# Patient Record
Sex: FEMALE | Race: WHITE | URBAN - METROPOLITAN AREA
[De-identification: names, ages, dates, MRNs, and addresses within clinical notes are randomized per-mention and may not be internally consistent; named-entity substitution may affect disease eponyms.]

---

## 2018-05-07 PROBLEM — Z00.00 ENCOUNTER FOR PREVENTIVE HEALTH EXAMINATION: Status: ACTIVE | Noted: 2018-05-07

## 2019-07-31 ENCOUNTER — EMERGENCY (EMERGENCY)
Facility: HOSPITAL | Age: 79
LOS: 0 days | Discharge: HOME | End: 2019-07-31
Attending: EMERGENCY MEDICINE | Admitting: EMERGENCY MEDICINE
Payer: MEDICARE

## 2019-07-31 VITALS
HEART RATE: 55 BPM | SYSTOLIC BLOOD PRESSURE: 102 MMHG | RESPIRATION RATE: 17 BRPM | DIASTOLIC BLOOD PRESSURE: 67 MMHG | TEMPERATURE: 97 F | OXYGEN SATURATION: 100 %

## 2019-07-31 VITALS — HEIGHT: 63 IN | WEIGHT: 100.09 LBS

## 2019-07-31 DIAGNOSIS — S52.025A NONDISPLACED FRACTURE OF OLECRANON PROCESS WITHOUT INTRAARTICULAR EXTENSION OF LEFT ULNA, INITIAL ENCOUNTER FOR CLOSED FRACTURE: ICD-10-CM
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
## 2019-07-31 DIAGNOSIS — M25.562 PAIN IN LEFT KNEE: ICD-10-CM
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
## 2019-07-31 PROCEDURE — 29105 APPLICATION LONG ARM SPLINT: CPT | Mod: LT

## 2019-07-31 PROCEDURE — 73080 X-RAY EXAM OF ELBOW: CPT | Mod: 26,LT

## 2019-07-31 PROCEDURE — 99284 EMERGENCY DEPT VISIT MOD MDM: CPT

## 2019-07-31 NOTE — ED STATDOCS - NSFOLLOWUPINSTRUCTIONS_ED_ALL_ED_FT
Please follow up with Dr. Ernandez in office tomorrow     Splint Care    WHAT YOU NEED TO KNOW:    Splint care is important to help protect your splint until it comes off. Some splints are made of fiberglass or plaster that will need to dry and harden. Splint care will help the splint dry and harden correctly. Even after your splint hardens, it can be damaged.    DISCHARGE INSTRUCTIONS:    Return to the emergency department if:     You have increased pain.      Your fingers or toes are numb or tingling.      You feel burning or stinging around your injury.      Your nails, fingers, or toes turn pale, blue, or gray, and feel cold.      You have new or increased trouble moving your fingers or toes.      Your swelling gets worse.      The skin under your splint is bleeding or leaking pus.     Contact your healthcare provider if:     Your hard splint gets wet or is damaged.      You have a fever.      Your splint feels tighter.      You have itchy, dry skin under your splint that is getting worse.      The skin under your splint is red, or you have a new sore.      You notice a bad smell coming from your splint.       You have questions or concerns about your condition or care.    How to care for your splint:     Wait for your hard splint to harden completely. You may have to wait up to 3 days before you can walk on a plaster splint.      Check your splint and the skin around it each day. Check your splint for damage, such as cracks and breaks. Check your skin for redness, increased swelling, and sores. Loosen the elastic bandage around your splint if it feels too tight.      Keep your splint clean and dry. Keep dirt out of your splint. Before you bathe, wrap your hard splint with 2 layers of plastic. Then put a plastic bag over it. Keep the plastic bag tightly sealed. You can also ask your healthcare provider about waterproof shields. Do not put your hard splint in the water, even with a plastic bag over it. A wet splint can make your skin itchy, and may lead to infection.      Do not put powders or deodorants inside your splint. These can dry your skin and increase itching.       Do not try to scratch the skin inside your hard splint with sharp objects. Sharp objects can break off inside your splint or hurt your skin.       Do not pull the padding out of your splint. The padding inside your splint protects your skin. You may develop a sore on your skin if you take out the padding.    Follow up with your healthcare provider as directed within 1 to 2 weeks: Write down your questions so you remember to ask them during your visits.

## 2019-07-31 NOTE — ED ADULT NURSE NOTE - NSIMPLEMENTINTERV_GEN_ALL_ED
Implemented All Fall Risk Interventions:  [place] to call system. Call bell, personal items and telephone within reach. Instruct patient to call for assistance. Room bathroom lighting operational. Non-slip footwear when patient is off stretcher. Physically safe environment: no spills, clutter or unnecessary equipment. Stretcher in lowest position, wheels locked, appropriate side rails in place. Provide visual cue, wrist band, yellow gown, etc. Monitor gait and stability. Monitor for mental status changes and reorient to person, place, and time. Review medications for side effects contributing to fall risk. Reinforce activity limits and safety measures with patient and family.

## 2019-07-31 NOTE — ED ADULT NURSE NOTE - OBJECTIVE STATEMENT
Pt presents to ED c/o left elbow pain s/p trip and fall from the step in the house into the garage yesterday. Pt denies hitting head and LOC. + movement, +sensation, cap refill less than 3 sec

## 2019-07-31 NOTE — ED STATDOCS - PROGRESS NOTE DETAILS
L Elbow XR with olecranon fx, hand specialist on call Dr. Awais quinn, awaiting callback.  b/l knee XRs ordered, however pt refused stating she does not have knee pain.   Nia Sharpe PA-C Discussed case with Dr. Santiago, he advised placing pt in long arm cast and having her f/u in office tomorrow, pt placed in splint, splint care discussed,  plan to d/c home with outpt f/u tomorrow, pt agreeable to d/c and plan of care, all questions answered, return precautions given  Nia Sharpe PA-C

## 2019-07-31 NOTE — ED STATDOCS - ATTENDING CONTRIBUTION TO CARE
I, Pipe Mcgraw, performed the initial face to face bedside interview with this patient regarding history of present illness, review of symptoms and relevant past medical, social and family history.  I completed an independent physical examination.  I was the initial provider who evaluated this patient. I have signed out the follow up of any pending tests (i.e. labs, radiological studies) to the ACP.  I have communicated the patient’s plan of care and disposition with the ACP.  The history, relevant review of systems, past medical and surgical history, medical decision making, and physical examination was documented by the scribe in my presence and I attest to the accuracy of the documentation.

## 2019-07-31 NOTE — ED ADULT TRIAGE NOTE - CHIEF COMPLAINT QUOTE
patient ambulated in with a steady gait. states she fell yesterday down 2 stairs onto her left elbow and knee. denies hitting head. denies use of blood thinners. abrasions noted to left elbow and knee. mild swelling to left knee. hematoma noted to left elbow. unknown last tetanus.

## 2019-07-31 NOTE — ED STATDOCS - MUSCULOSKELETAL, MLM
range of motion is not limited and there is no muscle tenderness. No spinal tenderness. clavicles nontender. +R leg TTP to medial aspect

## 2019-07-31 NOTE — ED STATDOCS - CARE PROVIDER_API CALL
Simón Ernandez)  Orthopaedic Surgery; Surgery of the Hand  [address] [place] [address]  Phone: [phone]  Fax: [phone]  Follow Up Time:

## 2019-07-31 NOTE — ED STATDOCS - CLINICAL SUMMARY MEDICAL DECISION MAKING FREE TEXT BOX
XR L elbow and b/l knees XR L elbow and b/l knees  XR with olecranon fx, pt refused knee XRs, discussed case with ortho hand Dr. Ernandez, advised long arm splint and f/u in office tmrw

## 2019-07-31 NOTE — ED STATDOCS - SKIN, MLM
+1cm abrasion to dorsal surface of L elbow with edema. +TTP to dorsal aspect of L elbow. +2cm abrasions to b/l knees

## 2019-07-31 NOTE — ED STATDOCS - OBJECTIVE STATEMENT
80 y/o F with no relevant PMHx presenting to the ED s/p fall yesterday. Pt was walking down some steps into the garage when she suddenly fell forward onto the ground hitting her L arm and b/l knees. Pt now in ED c/o L elbow and L knee pain. Pt reports abrasions to both L elbow and L knee, mild swelling to L knee and a hematoma to her L elbow. Denies head strike, recent Tetanus, back pain, neck pain, or hip pain. Pt ambulates independently at baseline. Pt is not anticoagulated. NKDA.

## 2019-12-20 PROBLEM — Z78.9 OTHER SPECIFIED HEALTH STATUS: Chronic | Status: ACTIVE | Noted: 2019-07-31

## 2020-08-04 ENCOUNTER — INPATIENT (INPATIENT)
Facility: HOSPITAL | Age: 80
LOS: 5 days | Discharge: HOME HEALTH | DRG: 270 | End: 2020-08-10
Attending: HOSPITALIST | Admitting: HOSPITALIST
Payer: MEDICARE

## 2020-08-04 VITALS
WEIGHT: 100.09 LBS | TEMPERATURE: 98 F | HEART RATE: 47 BPM | OXYGEN SATURATION: 100 % | DIASTOLIC BLOOD PRESSURE: 45 MMHG | RESPIRATION RATE: 18 BRPM | SYSTOLIC BLOOD PRESSURE: 90 MMHG

## 2020-08-04 DIAGNOSIS — R00.1 BRADYCARDIA, UNSPECIFIED: ICD-10-CM

## 2020-08-04 LAB
ANION GAP SERPL CALC-SCNC: 6 MMOL/L — SIGNIFICANT CHANGE UP (ref 5–17)
APTT BLD: 28 SEC — SIGNIFICANT CHANGE UP (ref 27.5–35.5)
BASOPHILS # BLD AUTO: 0.07 K/UL — SIGNIFICANT CHANGE UP (ref 0–0.2)
BASOPHILS NFR BLD AUTO: 0.8 % — SIGNIFICANT CHANGE UP (ref 0–2)
BUN SERPL-MCNC: 18 MG/DL — SIGNIFICANT CHANGE UP (ref 7–23)
CALCIUM SERPL-MCNC: 9.6 MG/DL — SIGNIFICANT CHANGE UP (ref 8.5–10.1)
CHLORIDE SERPL-SCNC: 106 MMOL/L — SIGNIFICANT CHANGE UP (ref 96–108)
CO2 SERPL-SCNC: 31 MMOL/L — SIGNIFICANT CHANGE UP (ref 22–31)
CREAT SERPL-MCNC: 0.73 MG/DL — SIGNIFICANT CHANGE UP (ref 0.5–1.3)
EOSINOPHIL # BLD AUTO: 0.12 K/UL — SIGNIFICANT CHANGE UP (ref 0–0.5)
EOSINOPHIL NFR BLD AUTO: 1.4 % — SIGNIFICANT CHANGE UP (ref 0–6)
GLUCOSE SERPL-MCNC: 141 MG/DL — HIGH (ref 70–99)
HCT VFR BLD CALC: 42.9 % — SIGNIFICANT CHANGE UP (ref 34.5–45)
HGB BLD-MCNC: 13.5 G/DL — SIGNIFICANT CHANGE UP (ref 11.5–15.5)
IMM GRANULOCYTES NFR BLD AUTO: 0.2 % — SIGNIFICANT CHANGE UP (ref 0–1.5)
INR BLD: 1.11 RATIO — SIGNIFICANT CHANGE UP (ref 0.88–1.16)
LYMPHOCYTES # BLD AUTO: 0.62 K/UL — LOW (ref 1–3.3)
LYMPHOCYTES # BLD AUTO: 7.3 % — LOW (ref 13–44)
MAGNESIUM SERPL-MCNC: 2.7 MG/DL — HIGH (ref 1.6–2.6)
MCHC RBC-ENTMCNC: 28.2 PG — SIGNIFICANT CHANGE UP (ref 27–34)
MCHC RBC-ENTMCNC: 31.5 GM/DL — LOW (ref 32–36)
MCV RBC AUTO: 89.6 FL — SIGNIFICANT CHANGE UP (ref 80–100)
MONOCYTES # BLD AUTO: 0.56 K/UL — SIGNIFICANT CHANGE UP (ref 0–0.9)
MONOCYTES NFR BLD AUTO: 6.6 % — SIGNIFICANT CHANGE UP (ref 2–14)
NEUTROPHILS # BLD AUTO: 7.09 K/UL — SIGNIFICANT CHANGE UP (ref 1.8–7.4)
NEUTROPHILS NFR BLD AUTO: 83.7 % — HIGH (ref 43–77)
PLATELET # BLD AUTO: 434 K/UL — HIGH (ref 150–400)
POTASSIUM SERPL-MCNC: 4.1 MMOL/L — SIGNIFICANT CHANGE UP (ref 3.5–5.3)
POTASSIUM SERPL-SCNC: 4.1 MMOL/L — SIGNIFICANT CHANGE UP (ref 3.5–5.3)
PROTHROM AB SERPL-ACNC: 12.9 SEC — SIGNIFICANT CHANGE UP (ref 10.6–13.6)
RBC # BLD: 4.79 M/UL — SIGNIFICANT CHANGE UP (ref 3.8–5.2)
RBC # FLD: 14.5 % — SIGNIFICANT CHANGE UP (ref 10.3–14.5)
SODIUM SERPL-SCNC: 143 MMOL/L — SIGNIFICANT CHANGE UP (ref 135–145)
TROPONIN I SERPL-MCNC: 0.58 NG/ML — HIGH (ref 0.01–0.04)
WBC # BLD: 8.48 K/UL — SIGNIFICANT CHANGE UP (ref 3.8–10.5)
WBC # FLD AUTO: 8.48 K/UL — SIGNIFICANT CHANGE UP (ref 3.8–10.5)

## 2020-08-04 PROCEDURE — 92978 ENDOLUMINL IVUS OCT C 1ST: CPT | Mod: RC

## 2020-08-04 PROCEDURE — 80053 COMPREHEN METABOLIC PANEL: CPT

## 2020-08-04 PROCEDURE — 84443 ASSAY THYROID STIM HORMONE: CPT

## 2020-08-04 PROCEDURE — 97530 THERAPEUTIC ACTIVITIES: CPT | Mod: GP

## 2020-08-04 PROCEDURE — 93005 ELECTROCARDIOGRAM TRACING: CPT

## 2020-08-04 PROCEDURE — 85730 THROMBOPLASTIN TIME PARTIAL: CPT

## 2020-08-04 PROCEDURE — 97116 GAIT TRAINING THERAPY: CPT | Mod: GP

## 2020-08-04 PROCEDURE — C1769: CPT

## 2020-08-04 PROCEDURE — 93010 ELECTROCARDIOGRAM REPORT: CPT

## 2020-08-04 PROCEDURE — 93458 L HRT ARTERY/VENTRICLE ANGIO: CPT | Mod: 59

## 2020-08-04 PROCEDURE — 85610 PROTHROMBIN TIME: CPT

## 2020-08-04 PROCEDURE — 36415 COLL VENOUS BLD VENIPUNCTURE: CPT

## 2020-08-04 PROCEDURE — 93925 LOWER EXTREMITY STUDY: CPT

## 2020-08-04 PROCEDURE — C1887: CPT

## 2020-08-04 PROCEDURE — 86900 BLOOD TYPING SEROLOGIC ABO: CPT

## 2020-08-04 PROCEDURE — 83735 ASSAY OF MAGNESIUM: CPT

## 2020-08-04 PROCEDURE — 84484 ASSAY OF TROPONIN QUANT: CPT

## 2020-08-04 PROCEDURE — 85025 COMPLETE CBC W/AUTO DIFF WBC: CPT

## 2020-08-04 PROCEDURE — C1874: CPT

## 2020-08-04 PROCEDURE — 80048 BASIC METABOLIC PNL TOTAL CA: CPT

## 2020-08-04 PROCEDURE — 86901 BLOOD TYPING SEROLOGIC RH(D): CPT

## 2020-08-04 PROCEDURE — C9600: CPT

## 2020-08-04 PROCEDURE — 70450 CT HEAD/BRAIN W/O DYE: CPT | Mod: 26

## 2020-08-04 PROCEDURE — C1753: CPT

## 2020-08-04 PROCEDURE — 33967 INSERT I-AORT PERCUT DEVICE: CPT

## 2020-08-04 PROCEDURE — 85027 COMPLETE CBC AUTOMATED: CPT

## 2020-08-04 PROCEDURE — 86769 SARS-COV-2 COVID-19 ANTIBODY: CPT

## 2020-08-04 PROCEDURE — 93970 EXTREMITY STUDY: CPT

## 2020-08-04 PROCEDURE — C1894: CPT

## 2020-08-04 PROCEDURE — C1889: CPT

## 2020-08-04 PROCEDURE — 93306 TTE W/DOPPLER COMPLETE: CPT

## 2020-08-04 PROCEDURE — 84100 ASSAY OF PHOSPHORUS: CPT

## 2020-08-04 PROCEDURE — 97163 PT EVAL HIGH COMPLEX 45 MIN: CPT | Mod: GP

## 2020-08-04 PROCEDURE — 71045 X-RAY EXAM CHEST 1 VIEW: CPT | Mod: 26,CS

## 2020-08-04 PROCEDURE — 86850 RBC ANTIBODY SCREEN: CPT

## 2020-08-04 PROCEDURE — G0269: CPT | Mod: RC

## 2020-08-04 PROCEDURE — 99223 1ST HOSP IP/OBS HIGH 75: CPT | Mod: AI

## 2020-08-04 PROCEDURE — 82533 TOTAL CORTISOL: CPT

## 2020-08-04 RX ORDER — SODIUM CHLORIDE 9 MG/ML
2000 INJECTION INTRAMUSCULAR; INTRAVENOUS; SUBCUTANEOUS ONCE
Refills: 0 | Status: COMPLETED | OUTPATIENT
Start: 2020-08-04 | End: 2020-08-04

## 2020-08-04 RX ORDER — SODIUM CHLORIDE 9 MG/ML
1000 INJECTION INTRAMUSCULAR; INTRAVENOUS; SUBCUTANEOUS ONCE
Refills: 0 | Status: COMPLETED | OUTPATIENT
Start: 2020-08-04 | End: 2020-08-04

## 2020-08-04 RX ORDER — NOREPINEPHRINE BITARTRATE/D5W 8 MG/250ML
0.05 PLASTIC BAG, INJECTION (ML) INTRAVENOUS
Qty: 8 | Refills: 0 | Status: DISCONTINUED | OUTPATIENT
Start: 2020-08-04 | End: 2020-08-05

## 2020-08-04 RX ORDER — ONDANSETRON 8 MG/1
4 TABLET, FILM COATED ORAL ONCE
Refills: 0 | Status: COMPLETED | OUTPATIENT
Start: 2020-08-04 | End: 2020-08-04

## 2020-08-04 RX ADMIN — ONDANSETRON 4 MILLIGRAM(S): 8 TABLET, FILM COATED ORAL at 23:10

## 2020-08-04 RX ADMIN — SODIUM CHLORIDE 2000 MILLILITER(S): 9 INJECTION INTRAMUSCULAR; INTRAVENOUS; SUBCUTANEOUS at 22:05

## 2020-08-04 RX ADMIN — Medication 4.26 MICROGRAM(S)/KG/MIN: at 22:44

## 2020-08-04 RX ADMIN — SODIUM CHLORIDE 2000 MILLILITER(S): 9 INJECTION INTRAMUSCULAR; INTRAVENOUS; SUBCUTANEOUS at 21:16

## 2020-08-04 RX ADMIN — SODIUM CHLORIDE 1000 MILLILITER(S): 9 INJECTION INTRAMUSCULAR; INTRAVENOUS; SUBCUTANEOUS at 23:00

## 2020-08-04 RX ADMIN — SODIUM CHLORIDE 1000 MILLILITER(S): 9 INJECTION INTRAMUSCULAR; INTRAVENOUS; SUBCUTANEOUS at 20:40

## 2020-08-04 RX ADMIN — SODIUM CHLORIDE 1000 MILLILITER(S): 9 INJECTION INTRAMUSCULAR; INTRAVENOUS; SUBCUTANEOUS at 21:15

## 2020-08-04 RX ADMIN — SODIUM CHLORIDE 1000 MILLILITER(S): 9 INJECTION INTRAMUSCULAR; INTRAVENOUS; SUBCUTANEOUS at 22:11

## 2020-08-04 NOTE — ED ADULT NURSE NOTE - CHIEF COMPLAINT QUOTE
pt presents to ED due to syncopal episode witnessed by son pt has no complaints right now Guilherme (son) [phone]

## 2020-08-04 NOTE — ED ADULT NURSE NOTE - OBJECTIVE STATEMENT
pt presents to ED due to syncopal episode witnessed by son pt has no complaints right now Guilherme (son) [phone]

## 2020-08-04 NOTE — ED ADULT NURSE NOTE - NSIMPLEMENTINTERV_GEN_ALL_ED
Implemented All Fall with Harm Risk Interventions:  [place] to call system. Call bell, personal items and telephone within reach. Instruct patient to call for assistance. Room bathroom lighting operational. Non-slip footwear when patient is off stretcher. Physically safe environment: no spills, clutter or unnecessary equipment. Stretcher in lowest position, wheels locked, appropriate side rails in place. Provide visual cue, wrist band, yellow gown, etc. Monitor gait and stability. Monitor for mental status changes and reorient to person, place, and time. Review medications for side effects contributing to fall risk. Reinforce activity limits and safety measures with patient and family. Provide visual clues: red socks.

## 2020-08-04 NOTE — ED ADULT TRIAGE NOTE - CHIEF COMPLAINT QUOTE
pt presents to ED due to syncopal episode witnessed by son pt has no complaints right now Guilherme (son) [phone]

## 2020-08-04 NOTE — ED PROVIDER NOTE - PROGRESS NOTE DETAILS
Scribe IN for Dr. Celis: Pt found to be bradycardic and hypotensive, asymptomatic, is awake alert. Bradycardia lasting for 5 min and goes back to NSR at rate of 60-80. At some point during conversing with pt, will contact cardiology and EP for consult. Scribe IN for Dr. Celis: Spoke with Dr. Logan EP who states to admit to CCU. Will set things up for pacemaker tomorrow morning.

## 2020-08-04 NOTE — ED PROVIDER NOTE - NS_ ATTENDINGSCRIBEDETAILS _ED_A_ED_FT
I, Tremayne Celis MD,  performed the initial face to face bedside interview with this patient regarding history of present illness, review of symptoms and relevant past medical, social and family history.  I completed an independent physical examination.    The history, relevant review of systems, past medical and surgical history, medical decision making, and physical examination was documented by the scribe in my presence and I attest to the accuracy of the documentation.

## 2020-08-05 DIAGNOSIS — I21.4 NON-ST ELEVATION (NSTEMI) MYOCARDIAL INFARCTION: ICD-10-CM

## 2020-08-05 DIAGNOSIS — I95.9 HYPOTENSION, UNSPECIFIED: ICD-10-CM

## 2020-08-05 DIAGNOSIS — R00.1 BRADYCARDIA, UNSPECIFIED: ICD-10-CM

## 2020-08-05 LAB
ALBUMIN SERPL ELPH-MCNC: 2.3 G/DL — LOW (ref 3.3–5)
ALBUMIN SERPL ELPH-MCNC: 2.4 G/DL — LOW (ref 3.3–5)
ALP SERPL-CCNC: 71 U/L — SIGNIFICANT CHANGE UP (ref 40–120)
ALP SERPL-CCNC: 76 U/L — SIGNIFICANT CHANGE UP (ref 40–120)
ALT FLD-CCNC: 167 U/L — HIGH (ref 12–78)
ALT FLD-CCNC: 210 U/L — HIGH (ref 12–78)
ANION GAP SERPL CALC-SCNC: 5 MMOL/L — SIGNIFICANT CHANGE UP (ref 5–17)
ANION GAP SERPL CALC-SCNC: 7 MMOL/L — SIGNIFICANT CHANGE UP (ref 5–17)
APTT BLD: 64.2 SEC — HIGH (ref 27.5–35.5)
AST SERPL-CCNC: 170 U/L — HIGH (ref 15–37)
AST SERPL-CCNC: 220 U/L — HIGH (ref 15–37)
BASOPHILS # BLD AUTO: 0.03 K/UL — SIGNIFICANT CHANGE UP (ref 0–0.2)
BASOPHILS NFR BLD AUTO: 0.4 % — SIGNIFICANT CHANGE UP (ref 0–2)
BILIRUB SERPL-MCNC: 0.3 MG/DL — SIGNIFICANT CHANGE UP (ref 0.2–1.2)
BILIRUB SERPL-MCNC: 0.5 MG/DL — SIGNIFICANT CHANGE UP (ref 0.2–1.2)
BUN SERPL-MCNC: 11 MG/DL — SIGNIFICANT CHANGE UP (ref 7–23)
BUN SERPL-MCNC: 13 MG/DL — SIGNIFICANT CHANGE UP (ref 7–23)
CALCIUM SERPL-MCNC: 7.5 MG/DL — LOW (ref 8.5–10.1)
CALCIUM SERPL-MCNC: 7.5 MG/DL — LOW (ref 8.5–10.1)
CHLORIDE SERPL-SCNC: 114 MMOL/L — HIGH (ref 96–108)
CHLORIDE SERPL-SCNC: 116 MMOL/L — HIGH (ref 96–108)
CO2 SERPL-SCNC: 22 MMOL/L — SIGNIFICANT CHANGE UP (ref 22–31)
CO2 SERPL-SCNC: 23 MMOL/L — SIGNIFICANT CHANGE UP (ref 22–31)
CORTIS AM PEAK SERPL-MCNC: 6.9 UG/DL — SIGNIFICANT CHANGE UP (ref 6–18.4)
CREAT SERPL-MCNC: 0.53 MG/DL — SIGNIFICANT CHANGE UP (ref 0.5–1.3)
CREAT SERPL-MCNC: 0.65 MG/DL — SIGNIFICANT CHANGE UP (ref 0.5–1.3)
EOSINOPHIL # BLD AUTO: 0.02 K/UL — SIGNIFICANT CHANGE UP (ref 0–0.5)
EOSINOPHIL NFR BLD AUTO: 0.3 % — SIGNIFICANT CHANGE UP (ref 0–6)
GLUCOSE SERPL-MCNC: 113 MG/DL — HIGH (ref 70–99)
GLUCOSE SERPL-MCNC: 90 MG/DL — SIGNIFICANT CHANGE UP (ref 70–99)
HCT VFR BLD CALC: 32.2 % — LOW (ref 34.5–45)
HCT VFR BLD CALC: 34.5 % — SIGNIFICANT CHANGE UP (ref 34.5–45)
HGB BLD-MCNC: 10.2 G/DL — LOW (ref 11.5–15.5)
HGB BLD-MCNC: 10.8 G/DL — LOW (ref 11.5–15.5)
IMM GRANULOCYTES NFR BLD AUTO: 0.4 % — SIGNIFICANT CHANGE UP (ref 0–1.5)
INR BLD: 1.55 RATIO — HIGH (ref 0.88–1.16)
LYMPHOCYTES # BLD AUTO: 0.6 K/UL — LOW (ref 1–3.3)
LYMPHOCYTES # BLD AUTO: 7.8 % — LOW (ref 13–44)
MAGNESIUM SERPL-MCNC: 2 MG/DL — SIGNIFICANT CHANGE UP (ref 1.6–2.6)
MCHC RBC-ENTMCNC: 28.3 PG — SIGNIFICANT CHANGE UP (ref 27–34)
MCHC RBC-ENTMCNC: 28.4 PG — SIGNIFICANT CHANGE UP (ref 27–34)
MCHC RBC-ENTMCNC: 31.3 GM/DL — LOW (ref 32–36)
MCHC RBC-ENTMCNC: 31.7 GM/DL — LOW (ref 32–36)
MCV RBC AUTO: 89.2 FL — SIGNIFICANT CHANGE UP (ref 80–100)
MCV RBC AUTO: 90.8 FL — SIGNIFICANT CHANGE UP (ref 80–100)
MONOCYTES # BLD AUTO: 0.64 K/UL — SIGNIFICANT CHANGE UP (ref 0–0.9)
MONOCYTES NFR BLD AUTO: 8.3 % — SIGNIFICANT CHANGE UP (ref 2–14)
NEUTROPHILS # BLD AUTO: 6.39 K/UL — SIGNIFICANT CHANGE UP (ref 1.8–7.4)
NEUTROPHILS NFR BLD AUTO: 82.8 % — HIGH (ref 43–77)
PHOSPHATE SERPL-MCNC: 3.1 MG/DL — SIGNIFICANT CHANGE UP (ref 2.5–4.5)
PLATELET # BLD AUTO: 269 K/UL — SIGNIFICANT CHANGE UP (ref 150–400)
PLATELET # BLD AUTO: 296 K/UL — SIGNIFICANT CHANGE UP (ref 150–400)
POTASSIUM SERPL-MCNC: 3.7 MMOL/L — SIGNIFICANT CHANGE UP (ref 3.5–5.3)
POTASSIUM SERPL-MCNC: 4 MMOL/L — SIGNIFICANT CHANGE UP (ref 3.5–5.3)
POTASSIUM SERPL-SCNC: 3.7 MMOL/L — SIGNIFICANT CHANGE UP (ref 3.5–5.3)
POTASSIUM SERPL-SCNC: 4 MMOL/L — SIGNIFICANT CHANGE UP (ref 3.5–5.3)
PROT SERPL-MCNC: 4.8 GM/DL — LOW (ref 6–8.3)
PROT SERPL-MCNC: 5.1 GM/DL — LOW (ref 6–8.3)
PROTHROM AB SERPL-ACNC: 17.6 SEC — HIGH (ref 10.6–13.6)
RBC # BLD: 3.61 M/UL — LOW (ref 3.8–5.2)
RBC # BLD: 3.8 M/UL — SIGNIFICANT CHANGE UP (ref 3.8–5.2)
RBC # FLD: 14.6 % — HIGH (ref 10.3–14.5)
RBC # FLD: 14.8 % — HIGH (ref 10.3–14.5)
SARS-COV-2 IGG SERPL QL IA: NEGATIVE — SIGNIFICANT CHANGE UP
SARS-COV-2 IGM SERPL IA-ACNC: <0.1 INDEX — SIGNIFICANT CHANGE UP
SARS-COV-2 RNA SPEC QL NAA+PROBE: SIGNIFICANT CHANGE UP
SODIUM SERPL-SCNC: 143 MMOL/L — SIGNIFICANT CHANGE UP (ref 135–145)
SODIUM SERPL-SCNC: 144 MMOL/L — SIGNIFICANT CHANGE UP (ref 135–145)
TROPONIN I SERPL-MCNC: 18.4 NG/ML — HIGH (ref 0.01–0.04)
TROPONIN I SERPL-MCNC: 25.4 NG/ML — HIGH (ref 0.01–0.04)
TROPONIN I SERPL-MCNC: 3.7 NG/ML — HIGH (ref 0.01–0.04)
TSH SERPL-MCNC: 0.92 UU/ML — SIGNIFICANT CHANGE UP (ref 0.34–4.82)
WBC # BLD: 7.45 K/UL — SIGNIFICANT CHANGE UP (ref 3.8–10.5)
WBC # BLD: 7.71 K/UL — SIGNIFICANT CHANGE UP (ref 3.8–10.5)
WBC # FLD AUTO: 7.45 K/UL — SIGNIFICANT CHANGE UP (ref 3.8–10.5)
WBC # FLD AUTO: 7.71 K/UL — SIGNIFICANT CHANGE UP (ref 3.8–10.5)

## 2020-08-05 PROCEDURE — 99291 CRITICAL CARE FIRST HOUR: CPT

## 2020-08-05 PROCEDURE — 33967 INSERT I-AORT PERCUT DEVICE: CPT

## 2020-08-05 PROCEDURE — G0508: CPT | Mod: GC,95

## 2020-08-05 PROCEDURE — 92941 PRQ TRLML REVSC TOT OCCL AMI: CPT | Mod: RC

## 2020-08-05 PROCEDURE — 93306 TTE W/DOPPLER COMPLETE: CPT | Mod: 26

## 2020-08-05 PROCEDURE — 93458 L HRT ARTERY/VENTRICLE ANGIO: CPT | Mod: 26,XU

## 2020-08-05 PROCEDURE — 99152 MOD SED SAME PHYS/QHP 5/>YRS: CPT

## 2020-08-05 PROCEDURE — 92978 ENDOLUMINL IVUS OCT C 1ST: CPT | Mod: 26,RC

## 2020-08-05 PROCEDURE — 99233 SBSQ HOSP IP/OBS HIGH 50: CPT

## 2020-08-05 PROCEDURE — 93010 ELECTROCARDIOGRAM REPORT: CPT

## 2020-08-05 PROCEDURE — 99223 1ST HOSP IP/OBS HIGH 75: CPT | Mod: 25

## 2020-08-05 RX ORDER — SODIUM CHLORIDE 9 MG/ML
1000 INJECTION INTRAMUSCULAR; INTRAVENOUS; SUBCUTANEOUS
Refills: 0 | Status: DISCONTINUED | OUTPATIENT
Start: 2020-08-05 | End: 2020-08-06

## 2020-08-05 RX ORDER — ONDANSETRON 8 MG/1
4 TABLET, FILM COATED ORAL EVERY 6 HOURS
Refills: 0 | Status: DISCONTINUED | OUTPATIENT
Start: 2020-08-05 | End: 2020-08-10

## 2020-08-05 RX ORDER — HEPARIN SODIUM 5000 [USP'U]/ML
700 INJECTION INTRAVENOUS; SUBCUTANEOUS
Qty: 25000 | Refills: 0 | Status: DISCONTINUED | OUTPATIENT
Start: 2020-08-05 | End: 2020-08-06

## 2020-08-05 RX ORDER — CHLORHEXIDINE GLUCONATE 213 G/1000ML
1 SOLUTION TOPICAL
Refills: 0 | Status: DISCONTINUED | OUTPATIENT
Start: 2020-08-05 | End: 2020-08-10

## 2020-08-05 RX ORDER — HEPARIN SODIUM 5000 [USP'U]/ML
INJECTION INTRAVENOUS; SUBCUTANEOUS
Qty: 25000 | Refills: 0 | Status: DISCONTINUED | OUTPATIENT
Start: 2020-08-05 | End: 2020-08-05

## 2020-08-05 RX ORDER — HEPARIN SODIUM 5000 [USP'U]/ML
3500 INJECTION INTRAVENOUS; SUBCUTANEOUS EVERY 6 HOURS
Refills: 0 | Status: DISCONTINUED | OUTPATIENT
Start: 2020-08-05 | End: 2020-08-05

## 2020-08-05 RX ORDER — NOREPINEPHRINE BITARTRATE/D5W 8 MG/250ML
0.03 PLASTIC BAG, INJECTION (ML) INTRAVENOUS
Qty: 8 | Refills: 0 | Status: DISCONTINUED | OUTPATIENT
Start: 2020-08-05 | End: 2020-08-05

## 2020-08-05 RX ORDER — ACETAMINOPHEN 500 MG
650 TABLET ORAL EVERY 4 HOURS
Refills: 0 | Status: DISCONTINUED | OUTPATIENT
Start: 2020-08-05 | End: 2020-08-10

## 2020-08-05 RX ORDER — HEPARIN SODIUM 5000 [USP'U]/ML
1500 INJECTION INTRAVENOUS; SUBCUTANEOUS EVERY 6 HOURS
Refills: 0 | Status: DISCONTINUED | OUTPATIENT
Start: 2020-08-05 | End: 2020-08-05

## 2020-08-05 RX ORDER — HEPARIN SODIUM 5000 [USP'U]/ML
5000 INJECTION INTRAVENOUS; SUBCUTANEOUS EVERY 12 HOURS
Refills: 0 | Status: DISCONTINUED | OUTPATIENT
Start: 2020-08-05 | End: 2020-08-05

## 2020-08-05 RX ORDER — SODIUM CHLORIDE 9 MG/ML
1000 INJECTION, SOLUTION INTRAVENOUS ONCE
Refills: 0 | Status: COMPLETED | OUTPATIENT
Start: 2020-08-05 | End: 2020-08-05

## 2020-08-05 RX ORDER — SODIUM CHLORIDE 9 MG/ML
1000 INJECTION INTRAMUSCULAR; INTRAVENOUS; SUBCUTANEOUS
Refills: 0 | Status: DISCONTINUED | OUTPATIENT
Start: 2020-08-05 | End: 2020-08-05

## 2020-08-05 RX ORDER — ASPIRIN/CALCIUM CARB/MAGNESIUM 324 MG
81 TABLET ORAL DAILY
Refills: 0 | Status: DISCONTINUED | OUTPATIENT
Start: 2020-08-06 | End: 2020-08-10

## 2020-08-05 RX ORDER — ATORVASTATIN CALCIUM 80 MG/1
40 TABLET, FILM COATED ORAL AT BEDTIME
Refills: 0 | Status: DISCONTINUED | OUTPATIENT
Start: 2020-08-05 | End: 2020-08-10

## 2020-08-05 RX ORDER — CLOPIDOGREL BISULFATE 75 MG/1
75 TABLET, FILM COATED ORAL DAILY
Refills: 0 | Status: DISCONTINUED | OUTPATIENT
Start: 2020-08-06 | End: 2020-08-10

## 2020-08-05 RX ORDER — ATROPINE SULFATE 0.1 MG/ML
0.5 SYRINGE (ML) INJECTION ONCE
Refills: 0 | Status: DISCONTINUED | OUTPATIENT
Start: 2020-08-05 | End: 2020-08-10

## 2020-08-05 RX ADMIN — SODIUM CHLORIDE 1000 MILLILITER(S): 9 INJECTION, SOLUTION INTRAVENOUS at 03:30

## 2020-08-05 RX ADMIN — SODIUM CHLORIDE 100 MILLILITER(S): 9 INJECTION INTRAMUSCULAR; INTRAVENOUS; SUBCUTANEOUS at 15:00

## 2020-08-05 RX ADMIN — HEPARIN SODIUM 800 UNIT(S)/HR: 5000 INJECTION INTRAVENOUS; SUBCUTANEOUS at 05:27

## 2020-08-05 RX ADMIN — SODIUM CHLORIDE 100 MILLILITER(S): 9 INJECTION INTRAMUSCULAR; INTRAVENOUS; SUBCUTANEOUS at 03:08

## 2020-08-05 RX ADMIN — SODIUM CHLORIDE 150 MILLILITER(S): 9 INJECTION INTRAMUSCULAR; INTRAVENOUS; SUBCUTANEOUS at 21:28

## 2020-08-05 RX ADMIN — Medication 2.55 MICROGRAM(S)/KG/MIN: at 05:27

## 2020-08-05 RX ADMIN — ATORVASTATIN CALCIUM 40 MILLIGRAM(S): 80 TABLET, FILM COATED ORAL at 21:28

## 2020-08-05 NOTE — PATIENT PROFILE ADULT - DOES PATIENT HAVE ADVANCE DIRECTIVE
pt's son is HCP; unable to provide information at this time; family is not at bedside and pt is forgetful

## 2020-08-05 NOTE — PROGRESS NOTE ADULT - SUBJECTIVE AND OBJECTIVE BOX
HPI:  80 year old female patient presented to the ED after a witnessed syncopal episode. Patient does not recall events surrounding loss of consciousness but states she was notified by her  that she appeared as though she was about to fall but was caught by her  and son before going limp. Patient with months long history of chest pain but denies any palpitations, dizziness, sob, peripheral edema, abdominal pain, nausea or vomiting.  In the ED patient with sinus bradycardia on telemetry ranging from mid 30s to 50s with occasional PVC. Patient was started on levophed by ED provider.  Pt reported to have mid anterior chest discomfort for a week and found elevating Troponin 0.58--> 3.7.   Pt brought to cath lab this early morning for ischemic evaluation.     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 IABP placed and LV FX showed EF 50%.     Interventional Conclusions     Successful PCI of RCA   IABP placed prior to intervention for cardiogenic shock     Recommendations     Manage with medical therapy.   IABP weaning off over the next 24 hrs.    < end of copied text >    ROS: denies chest pain/ pressure, SOB or palpitation     Vital Signs;  T(C): 36.6 (08-05-20 @ 04:29), Max: 36.7 (08-05-20 @ 03:00)  HR: 50 (08-05-20 @ 06:00) (39 - 70)  BP: 102/58 (08-05-20 @ 06:00) (51/43 - 105/49)  RR: 16 (08-05-20 @ 06:00) (12 - 18)  SpO2: 98% (08-05-20 @ 06:00) (94% - 100%)    Physical Exam   General: awake, no acute distress   HEENT: NCAT, neck supple   CV: RRR, normal S1S2, no murmur/ rub   Pulmonary: clear, no wheezing or rales   GI: +BS, soft, non-tender, non-distended   : voiding freely   Extremities: no edema, + 1 pedal pulses B/L LE   Skin: no rashes or lesion. Rt. femoral access site (s/p perclose), Lt femoral access with IABP; no hematoma or bleeding     Labs:  LABS: All Labs Reviewed:                        13.5   8.48  )-----------( 434      ( 04 Aug 2020 19:31 )             42.9     08-04    143  |  106  |  18  ----------------------------<  141<H>  4.1   |  31  |  0.73    Ca    9.6      04 Aug 2020 19:31  Mg     2.7     08-04      PT/INR - ( 04 Aug 2020 19:31 )   PT: 12.9 sec;   INR: 1.11 ratio     PTT - ( 04 Aug 2020 19:31 )  PTT:28.0 sec  CARDIAC MARKERS ( 05 Aug 2020 02:25 )  3.700 ng/mL / x     / x     / x     / x      CARDIAC MARKERS ( 04 Aug 2020 19:31 )  0.584 ng/mL / x     / x     / x     / x        Medications:  acetaminophen   Tablet .. 650 milliGRAM(s) Oral every 4 hours PRN  atorvastatin 40 milliGRAM(s) Oral at bedtime  atropine Injectable 0.5 milliGRAM(s) IV Push once PRN  chlorhexidine 4% Liquid 1 Application(s) Topical <User Schedule>  heparin  Infusion 700 Unit(s)/Hr IV Continuous <Continuous>  norepinephrine Infusion 0.03 MICROgram(s)/kG/Min IV Continuous <Continuous>  ondansetron Injectable 4 milliGRAM(s) IV Push every 6 hours PRN  sodium chloride 0.9%. 1000 milliLiter(s) IV Continuous <Continuous>  sodium chloride 0.9%. 1000 milliLiter(s) IV Continuous <Continuous>    # NSTEMI: s/p thrombectomy, s/p IABP, s/p mid RCA stent x1   - CCU monitor   - IV hydration  - Labs and EKG in am   - trend troponin   - continue ASA and Plavix 75 mg daily (given loading dose 600mg in cath lab)   - No BB/ ACEI due to hypotensive with bradycardia   - start Lipitor 40 mg daily   - maintain IABP, consider to dc tomorrow if pt stable   - Heparin 700 unit/ hr  while on IABP  - EP consult    - Echo for structure   - post procedure, outcome and follow up care reviewed with patient/Dr. Jesus   - follow up with Dr. Jesus in 4-7 days    Discussed the plan with Dr. Jesus, Pt and cath RN

## 2020-08-05 NOTE — CONSULT NOTE ADULT - PROBLEM SELECTOR RECOMMENDATION 2
monitor blood pressure and mantal satus   admit to CCU  keep atropine at the bedside as well as Zole monitor for symptomatic bradycardia  continue IVF hydration  follow electrolytes   echo cardiogram   EP aware of patient and to evaluate morning

## 2020-08-05 NOTE — PROGRESS NOTE ADULT - SUBJECTIVE AND OBJECTIVE BOX
Reason for Admission: Symptomatic bradycardia Syncope	  History of Present Illness: 	  80 year old female patient presented to the ED after a witnessed syncopal episode. Patient does not recall events surrounding loss of consciousness but states she was notified by her  that she appeared as though she was about to fall but was caught by her  and son before going limp. Patient with months long history of chest pain but denies any palpitations, dizziness, sob, peripheral edema, abdominal pain, nausea or vomiting.    In the ED patient with sinus bradycardia on telemetry ranging from mid 30s to 50s with occasional PVC. Patient was started on levophed by ED provider.      REVIEW OF SYSTEMS:  General: NAD, hemodynamically stable, (-)  fever, (-) chills, (-) weakness  HEENT:  Eyes:  No visual loss, blurred vision, double vision or yellow sclerae. Ears, Nose, Throat:  No hearing loss, sneezing, congestion, runny nose or sore throat.  SKIN:  No rash or itching.  CARDIOVASCULAR:  No chest pain, chest pressure or chest discomfort. No palpitations or edema.  RESPIRATORY:  No shortness of breath, cough or sputum.  GASTROINTESTINAL:  No anorexia, nausea, vomiting or diarrhea. No abdominal pain or blood.  NEUROLOGICAL:  No headache, dizziness, syncope, paralysis, ataxia, numbness or tingling in the extremities. No change in bowel or bladder control.  MUSCULOSKELETAL:  No muscle, back pain, joint pain or stiffness.  HEMATOLOGIC:  No anemia, bleeding or bruising.  LYMPHATICS:  No enlarged nodes. No history of splenectomy.  ENDOCRINOLOGIC:  No reports of sweating, cold or heat intolerance. No polyuria or polydipsia.  ALLERGIES:  No history of asthma, hives, eczema or rhinitis.    Physical Exam:   GENERAL APPEARANCE:  NAD, hemodynamically stable  T(C): 36.6 (08-05-20 @ 04:29), Max: 36.7 (08-05-20 @ 03:00)  HR: 43 (08-05-20 @ 10:00) (39 - 70)  BP: 91/51 (08-05-20 @ 10:00) (51/43 - 106/64)  RR: 16 (08-05-20 @ 10:00) (12 - 18)  SpO2: 94% (08-05-20 @ 10:00) (94% - 100%)  HEENT:  Head is normocephalic    Skin:  Warm and dry without any rash   NECK:  Supple without lymphadenopathy.   HEART:  Regular rate and rhythm. normal S1 and S2, No M/R/G  LUNGS:  Good ins/exp effort, no W/R/R/C  ABDOMEN:  Soft, nontender, nondistended with good bowel sounds heard  EXTREMITIES:  Without cyanosis, clubbing or edema.   NEUROLOGICAL:  Gross nonfocal     Labs:   CBC Full  -  ( 05 Aug 2020 10:19 )  WBC Count : 7.71 K/uL  RBC Count : 3.80 M/uL  Hemoglobin : 10.8 g/dL  Hematocrit : 34.5 %  Platelet Count - Automated : 296 K/uL    PT/INR - ( 05 Aug 2020 10:19 )   PT: 17.6 sec;   INR: 1.55 ratio         PTT - ( 04 Aug 2020 19:31 )  PTT:28.0 sec    08-05    143  |  114<H>  |  13  ----------------------------<  90  4.0   |  22  |  0.65    Ca    7.5<L>      05 Aug 2020 10:19  Phos  3.1     08-05  Mg     2.0     08-05    TPro  5.1<L>  /  Alb  2.4<L>  /  TBili  0.5  /  DBili  x   /  AST  220<H>  /  ALT  210<H>  /  AlkPhos  76  08-05      # NSTEMI: s/p thrombectomy, s/p IABP, s/p mid RCA stent x1   - DAPT / STATIN  - maintain IABP, consider to dc tomorrow if pt stable   - Heparin 700 unit/ hr  while on IABP  - follow up with Dr. Jesus in 4-7 days    # bradycardic and hypotensive secondary to above   - s/p cath off of pressors,  - to remain on IV heparin,   - IABP  - critical care will follow prn Reason for Admission: Symptomatic bradycardia Syncope	  History of Present Illness: 	  80 year old female patient presented to the ED after a witnessed syncopal episode. Patient does not recall events surrounding loss of consciousness but states she was notified by her  that she appeared as though she was about to fall but was caught by her  and son before going limp. Patient with months long history of chest pain but denies any palpitations, dizziness, sob, peripheral edema, abdominal pain, nausea or vomiting.    Patient continues to have low BPs in the 90s /  HRs in the 40s. asymptomatic. No arrhtyhmias on tele.       REVIEW OF SYSTEMS:  General: NAD, hemodynamically stable, (-)  fever, (-) chills, (-) weakness  HEENT:  Eyes:  No visual loss, blurred vision, double vision or yellow sclerae. Ears, Nose, Throat:  No hearing loss, sneezing, congestion, runny nose or sore throat.  SKIN:  No rash or itching.  CARDIOVASCULAR:  No chest pain, chest pressure or chest discomfort. No palpitations or edema.  RESPIRATORY:  No shortness of breath, cough or sputum.  GASTROINTESTINAL:  No anorexia, nausea, vomiting or diarrhea. No abdominal pain or blood.  NEUROLOGICAL:  No headache, dizziness, syncope, paralysis, ataxia, numbness or tingling in the extremities. No change in bowel or bladder control.  MUSCULOSKELETAL:  No muscle, back pain, joint pain or stiffness.  HEMATOLOGIC:  No anemia, bleeding or bruising.  LYMPHATICS:  No enlarged nodes. No history of splenectomy.  ENDOCRINOLOGIC:  No reports of sweating, cold or heat intolerance. No polyuria or polydipsia.  ALLERGIES:  No history of asthma, hives, eczema or rhinitis.    Physical Exam:   GENERAL APPEARANCE:  NAD, hemodynamically stable  T(C): 36.6 (08-05-20 @ 04:29), Max: 36.7 (08-05-20 @ 03:00)  HR: 43 (08-05-20 @ 10:00) (39 - 70)  BP: 91/51 (08-05-20 @ 10:00) (51/43 - 106/64)  RR: 16 (08-05-20 @ 10:00) (12 - 18)  SpO2: 94% (08-05-20 @ 10:00) (94% - 100%)  HEENT:  Head is normocephalic    Skin:  Warm and dry without any rash   NECK:  Supple without lymphadenopathy.   HEART:  Regular rate and rhythm. normal S1 and S2, No M/R/G  LUNGS:  Good ins/exp effort, no W/R/R/C  ABDOMEN:  Soft, nontender, nondistended with good bowel sounds heard  EXTREMITIES:  Without cyanosis, clubbing or edema.   NEUROLOGICAL:  Gross nonfocal     Labs:   CBC Full  -  ( 05 Aug 2020 10:19 )  WBC Count : 7.71 K/uL  RBC Count : 3.80 M/uL  Hemoglobin : 10.8 g/dL  Hematocrit : 34.5 %  Platelet Count - Automated : 296 K/uL    PT/INR - ( 05 Aug 2020 10:19 )   PT: 17.6 sec;   INR: 1.55 ratio         PTT - ( 04 Aug 2020 19:31 )  PTT:28.0 sec    08-05    143  |  114<H>  |  13  ----------------------------<  90  4.0   |  22  |  0.65    Ca    7.5<L>      05 Aug 2020 10:19  Phos  3.1     08-05  Mg     2.0     08-05    TPro  5.1<L>  /  Alb  2.4<L>  /  TBili  0.5  /  DBili  x   /  AST  220<H>  /  ALT  210<H>  /  AlkPhos  76  08-05      # NSTEMI: s/p thrombectomy, s/p IABP, s/p mid RCA stent x1   - DAPT / STATIN  - maintain IABP, consider to dc tomorrow if pt stable   - Heparin 700 unit/ hr  while on IABP  - follow up with Dr. Jesus in 4-7 days    # bradycardic / hypotensive secondary to ischemia  - s/p cath   - off pressors  - IABP

## 2020-08-05 NOTE — CONSULT NOTE ADULT - PROBLEM SELECTOR RECOMMENDATION 3
will hold on anticoagulation   Follow  troponin levels   12 lead EKG  echocardiogram  cardiology eval pending

## 2020-08-05 NOTE — H&P ADULT - ASSESSMENT
80 year old female patient with symptomatic bradycardia likely leading to syncopal episode        -Admit to CCU      #Symptomatic bradycardia  -pt currently asymptomatic  -was previously on levophed in the ED  -monitor in the CCU  -give atropine for symptomatic bradycardia  -IVF hydration  -get morning tsh, mag, phos  -get morning echo  -EP to evaluate pt    #Syncope  -DDX: Vasovagal, orthostatic, structural heart disease, arrhythmias, electrolyte abnormalities  -monitor on tele  -IVF hydration   -morning morning echo  -cardiology to evaluate pt    #Elevated troponin  -trend troponin  -serial EKG  -get morning echo  -cardiology to evaluate pt    #Hx of Dementia  -on donepezil but will hold in lieu of bradycardia  -supportive care     #Advanced directives  -full code    #DVT ppx  -heparin sq 80 year old female patient with symptomatic bradycardia likely leading to syncopal episode        -Admit to CCU      #Symptomatic bradycardia  -pt currently asymptomatic  -was previously on levophed in the ED  -monitor in the CCU  -give atropine for symptomatic bradycardia  -IVF hydration  -get morning tsh, mag, phos  -get morning echo  -NPO for possible cardiology intervention  -EP to evaluate pt    #Syncope  -DDX: Vasovagal, orthostatic, structural heart disease, arrhythmias, electrolyte abnormalities  -monitor on tele  -IVF hydration   -morning morning echo  -cardiology to evaluate pt    #Elevated troponin  -trend troponin  -serial EKG  -get morning echo  -cardiology to evaluate pt    #Hx of Dementia  -on donepezil but will hold in lieu of bradycardia  -supportive care     #Advanced directives  -full code    #DVT ppx  -heparin sq

## 2020-08-05 NOTE — CONSULT NOTE ADULT - SUBJECTIVE AND OBJECTIVE BOX
REASON FOR CONSULT: Hypotension    Chief Complaint    HPI:80 year old female patient presented to the ED after a witnessed syncopal episode. Patient does not recall events surrounding loss of consciousness but states she was notified by her  that she appeared as though she was about to fall but was caught by her  and son before going limp. Patient with months long history of chest pain but denies any palpitations, dizziness, sob, peripheral edema, abdominal pain, nausea or vomiting.    In the ED patient with sinus bradycardia on telemetry ranging from mid 30s to 50s with occasional PVC. Patient was started on levophed by ED provider.  Case was discussed with the ICU PA.  Levophed was turned off without acute issues.  Patient was mentating well and asymptomatic with noted BP in the 90s.  Baseline BP is not known by patient.      PAST MEDICAL & SURGICAL HISTORY:  Deformity of toe of right foot  No pertinent past medical history    Allergies    No Known Allergies      FAMILY HISTORY:      Medications:  acetaminophen   Tablet .. 650 milliGRAM(s) Oral every 4 hours PRN  ondansetron Injectable 4 milliGRAM(s) IV Push every 6 hours PRN  heparin   Injectable 5000 Unit(s) SubCutaneous every 12 hours  atropine Injectable 0.5 milliGRAM(s) IV Push once PRN  sodium chloride 0.9%. 1000 milliLiter(s) IV Continuous <Continuous>  chlorhexidine 4% Liquid 1 Application(s) Topical <User Schedule>        Vital Signs Last 24 Hrs  T(C): 36.6 (04 Aug 2020 19:07), Max: 36.6 (04 Aug 2020 19:07)  T(F): 97.8 (04 Aug 2020 19:07), Max: 97.8 (04 Aug 2020 19:07)  HR: 48 (05 Aug 2020 02:22) (39 - 70)  BP: 90/47 (05 Aug 2020 02:22) (78/49 - 105/49)  BP(mean): 62 (05 Aug 2020 02:22) (61 - 79)  RR: 17 (05 Aug 2020 02:22) (16 - 18)  SpO2: 98% (05 Aug 2020 02:22) (94% - 100%)      LABS:                        13.5   8.48  )-----------( 434      ( 04 Aug 2020 19:31 )             42.9     08-04    143  |  106  |  18  ----------------------------<  141<H>  4.1   |  31  |  0.73    Ca    9.6      04 Aug 2020 19:31  Mg     2.7     08-04        CARDIAC MARKERS ( 04 Aug 2020 19:31 )  0.584 ng/mL / x     / x     / x     / x          CAPILLARY BLOOD GLUCOSE        PT/INR - ( 04 Aug 2020 19:31 )   PT: 12.9 sec;   INR: 1.11 ratio         PTT - ( 04 Aug 2020 19:31 )  PTT:28.0 sec    CULTURES:      Physical Examination:  Physical exam as per bedside MD (direct physical examination could not be performed because the visit was provided via Telemedicine):       RADIOLOGY: < from: CT Head No Cont (08.04.20 @ 19:46) >  IMPRESSION:    No acute intracranial hemorrhage, mass effect, or evidence of acute vascular territorial infarction.    < end of copied text >    IMP-  Sinus Bradycardia  Hypotension  Near Syncope reported by family    REC/Plan-  For Telemetry/CCU  ICU will follow as consult until Cardiology evaluates in am.  Random Cortisol in am        CRITICAL CARE TIME SPENT: 30    This visit was provided via telemedicine. Patient was located at     [facility]    Provider was located at [facility] [place], [place] for the visit.

## 2020-08-05 NOTE — CONSULT NOTE ADULT - ASSESSMENT
80 year old female post syncopal episode with slightly elevated troponin and sinus bradycardia who was hypotensive but seems to be responding to IV fluid hydration and otherwise asymptomatic. Patient is to be admitted to CCU off IV pressors and monitored. Case discussed with EICU attending Dr Fonseca and hospitalist Dr Gómez. Patient is admitted to CCU under Dr Gómez with Critical care team following , EP cardiologists Dr Logan aware and will evaluate in morning.     Critical Care time: 35 mins assessing presenting problems of acute illness that poses high probability of life threatening deterioration or end organ damage/dysfunction.  Medical decision making including Initiating plan of care, reviewing data, reviewing radiology, direct patient bedside evaluation and interpretation of vital signs, any necessary ventilator management , discussion with multidisciplinary team, discussing goals of care with patient/family, all non inclusive of procedures 80 year old female post syncopal episode with slightly elevated troponin and sinus bradycardia who was hypotensive but seems to be responding to IV fluid hydration and otherwise asymptomatic. Patient is to be admitted to CCU off IV pressors and monitored. Case discussed with EICU attending Dr Fonseca and hospitalist Dr Gómez. Patient is admitted to CCU under Dr Gómez with Critical care team following , EP cardiologists Dr Logan aware and will evaluate in morning.     Critical Care time: 40 mins assessing presenting problems of acute illness that poses high probability of life threatening deterioration or end organ damage/dysfunction.  Medical decision making including Initiating plan of care, reviewing data, reviewing radiology, direct patient bedside evaluation and interpretation of vital signs, any necessary ventilator management , discussion with multidisciplinary team, discussing goals of care with patient/family, all non inclusive of procedures

## 2020-08-05 NOTE — CONSULT NOTE ADULT - PROBLEM SELECTOR RECOMMENDATION 9
likely related to intravascular depletion from poor PO intake   but threshold for bradycardia related and will monitor    pt remained hypotensive with heart rates ranging into the low 60's   responding slowly to IV fluids   continue IV fluid hydration  follow up random AM cortisol to rule out adrenal insuffiencey

## 2020-08-05 NOTE — ED ADULT NURSE REASSESSMENT NOTE - NS ED NURSE REASSESS COMMENT FT1
received call from CCU Cristina CARVALHO, Tobi Evans Pt must be admitted under intensivist service because Pt is on Levophed. Spoke to Intensivist Jesus (PA), per Jesus., Hospitalist must admit pt. Spoke to Dr. Gómez (Hospitalist) at 0115, Dr Gómez will admit pt. dr Gómez wants to monitor pts BP off Levophed. Levophed stopped at 0120. Will continue to monitor BP

## 2020-08-05 NOTE — H&P ADULT - NSHPPHYSICALEXAM_GEN_ALL_CORE
Vital Signs Last 24 Hrs  T(C): 36.6 (04 Aug 2020 19:07), Max: 36.6 (04 Aug 2020 19:07)  T(F): 97.8 (04 Aug 2020 19:07), Max: 97.8 (04 Aug 2020 19:07)  HR: 59 (05 Aug 2020 00:00) (39 - 70)  BP: 105/49 (05 Aug 2020 00:00) (78/49 - 105/49)  BP(mean): 74 (04 Aug 2020 23:44) (61 - 79)  RR: 16 (05 Aug 2020 00:00) (16 - 18)  SpO2: 100% (05 Aug 2020 00:00) (94% - 100%)

## 2020-08-05 NOTE — CONSULT NOTE ADULT - ASSESSMENT
80 yr old female admitted after syncopal episode witnessed by family.  She does not recall details of the event.  In ED she was bradycardic, became hypotensive requiring pressor support.  She ruled in for NSTEMI, troponins >0.56 > 3.7 > 18.7.  s/p cath  with PCI to RCA, thrombectomy and IABP placement for cardiogenic shock.    A/P: symptomatic bradycardia, NSTEMI s/p PCI to RCA  Continuous tele monitoring in CCU  Heart rate currently sinus ran 40-50 bpm  Avoid any AV mady blocking agents.  IABP to be weaned over 24 hrs  LVEF 50%  Will follow  Plan d/w Dr. Logan.

## 2020-08-05 NOTE — H&P ADULT - HISTORY OF PRESENT ILLNESS
80 year old female patient presented to the ED after a witnessed syncopal episode. Patient does not recall events surrounding loss of consciousness but states she was notified by her  that she appeared as though she was about to fall but was caught by her  and son before going limp. Patient with months long history of chest pain but denies any palpitations, dizziness, sob, peripheral edema, abdominal pain, nausea or vomiting.      In the ED patient with sinus bradycardia on telemetry ranging from mid 30s to 50s with occasional PVC. Patient was started on levophed by ED provider.

## 2020-08-05 NOTE — CONSULT NOTE ADULT - SUBJECTIVE AND OBJECTIVE BOX
HPI:  80 year old female patient presented to the ED after a witnessed syncopal episode by son. Patient does not recall events surrounding loss of consciousness but states she was notified by her  that she appeared as though she was about to fall but was caught by her  and son before going limp. Patient with months long history of chest pain but denies any palpitations, dizziness, sob, peripheral edema, abdominal pain, nausea or vomiting.  In the ED patient with sinus bradycardia on telemetry ranging from mid 30s to 50s with occasional PVC. Patient was started on levophed by ED provider. (05 Aug 2020 01:08)    8/5/20:  Ep asked to see pt with symptomatic bradycardia.  +NSTEMI,  s/p cardiac cath with PCI to mid-RCA, thrombectomy, IABP placement.  TELE: sinus ran, rate 40-50 bpm, PVCs      PAST MEDICAL & SURGICAL HISTORY:  Deformity of toe of right foot  No pertinent past medical history    SOCIAL HISTORY: , lives with .  Denies tobacco, etoh abuse or illicit drug use    FAMILY HISTORY: unknown        MEDICATIONS  (STANDING):  atorvastatin 40 milliGRAM(s) Oral at bedtime  chlorhexidine 4% Liquid 1 Application(s) Topical <User Schedule>  heparin  Infusion 700 Unit(s)/Hr (7 mL/Hr) IV Continuous <Continuous>  sodium chloride 0.9%. 1000 milliLiter(s) (100 mL/Hr) IV Continuous <Continuous>  sodium chloride 0.9%. 1000 milliLiter(s) (100 mL/Hr) IV Continuous <Continuous>    MEDICATIONS  (PRN):  acetaminophen   Tablet .. 650 milliGRAM(s) Oral every 4 hours PRN Mild Pain (1 - 3)  atropine Injectable 0.5 milliGRAM(s) IV Push once PRN for symptomatic bradycardia  ondansetron Injectable 4 milliGRAM(s) IV Push every 6 hours PRN Nausea      Allergies    No Known Allergies    Intolerances        Vital Signs Last 24 Hrs  T(C): 36.6 (05 Aug 2020 04:29), Max: 36.7 (05 Aug 2020 03:00)  T(F): 97.9 (05 Aug 2020 04:29), Max: 98 (05 Aug 2020 03:00)  HR: 53 (05 Aug 2020 13:00) (39 - 70)  BP: 89/55 (05 Aug 2020 13:00) (51/43 - 106/64)  BP(mean): 63 (05 Aug 2020 13:00) (45 - 79)  RR: 17 (05 Aug 2020 13:00) (12 - 18)  SpO2: 95% (05 Aug 2020 13:00) (94% - 100%)    REVIEW OF SYSTEMS:    CONSTITUTIONAL:  As per HPI.  HEENT:  Eyes:  No diplopia or blurred vision. ENT:  No earache, sore throat or runny nose.  CARDIOVASCULAR:  No pressure, squeezing, strangling, tightness, heaviness or aching about the chest, neck, axilla or epigastrium.  RESPIRATORY:  No cough, shortness of breath, PND or orthopnea.  GASTROINTESTINAL:  No nausea, vomiting or diarrhea.  GENITOURINARY:  No dysuria, frequency or urgency.  MUSCULOSKELETAL:  As per HPI.  SKIN:  No change in skin, hair or nails.  NEUROLOGIC:  No paresthesias, fasciculations, seizures or weakness.  PSYCHIATRIC:  No disorder of thought or mood.  ENDOCRINE:  No heat or cold intolerance, polyuria or polydipsia.  HEMATOLOGICAL:  No easy bruising or bleedings:  .     PHYSICAL EXAMINATION:    GENERAL APPEARANCE:  Pt. is not currently dyspneic. Pt. is alert, oriented, and pleasant, forgetful at times.  HEENT:  Pupils are normal and react normally. No icterus. Mucous membranes well colored.  NECK:  Supple. No lymphadenopathy. Jugular venous pressure not elevated. Carotids equal.   HEART:   The cardiac impulse has a normal quality. There are no murmurs, rubs or gallops noted  CHEST:  Chest is clear to auscultation. Normal respiratory effort.  ABDOMEN:  Soft and nontender.   EXTREMITIES:  There is no edema. Left groin IABP.  SKIN:  No rash or significant lesions are noted.    I&O's Summary    04 Aug 2020 07:01  -  05 Aug 2020 07:00  --------------------------------------------------------  IN: 0 mL / OUT: 200 mL / NET: -200 mL        LABS:                        10.8   7.71  )-----------( 296      ( 05 Aug 2020 10:19 )             34.5     08-05    143  |  114<H>  |  13  ----------------------------<  90  4.0   |  22  |  0.65    Ca    7.5<L>      05 Aug 2020 10:19  Phos  3.1     08-05  Mg     2.0     08-05    TPro  5.1<L>  /  Alb  2.4<L>  /  TBili  0.5  /  DBili  x   /  AST  220<H>  /  ALT  210<H>  /  AlkPhos  76  08-05    LIVER FUNCTIONS - ( 05 Aug 2020 10:19 )  Alb: 2.4 g/dL / Pro: 5.1 gm/dL / ALK PHOS: 76 U/L / ALT: 210 U/L / AST: 220 U/L / GGT: x           PT/INR - ( 05 Aug 2020 10:19 )   PT: 17.6 sec;   INR: 1.55 ratio         PTT - ( 04 Aug 2020 19:31 )  PTT:28.0 sec    CARDIAC MARKERS ( 05 Aug 2020 10:19 )  18.400 ng/mL / x     / x     / x     / x      CARDIAC MARKERS ( 05 Aug 2020 02:25 )  3.700 ng/mL / x     / x     / x     / x      CARDIAC MARKERS ( 04 Aug 2020 19:31 )  0.584 ng/mL / x     / x     / x     / x          < from: Cardiac Cath Lab - Adult (08.05.20 @ 07:21) >   Impression     Diagnostic Conclusions   1 vessel CAd with NSTEMI that progressed to RV infarct and cardiogenic   shock requiring Levophed support. IABP placed and LV FX showed EF 50%.     Interventional Conclusions     Successful PCI of RCA   IABP placed prior to intervention for cardiogenic shock     Recommendations     Manage with medical therapy.   IABP weaning off over the next 24 hrs.    Estimated Blood Loss:5ml.    Complications:  No complication.     Signatures     ----------------------------------------------------------------   Electronically signed by Yousif Jesus MD(Performing   Physician) on 08/05/2020 08:27   ----------------------------------------------------------------

## 2020-08-05 NOTE — PHYSICAL THERAPY INITIAL EVALUATION ADULT - DIAGNOSIS, PT EVAL
Syncopal episode, bradycardia/hypotension secondary to ischemia. NSTEMI, cardiac cath: s/p thrombectomy, s/p IABP, s/p mid RCA stent x 1.

## 2020-08-05 NOTE — PROGRESS NOTE ADULT - ASSESSMENT
Patient s/p NSTEMI with positive troponin  was bradycardic and hypotensive secondary to above   s/p cath off of pressors,  to remain on IV heparin,   IABP  critical care will follow prn

## 2020-08-05 NOTE — PHYSICAL THERAPY INITIAL EVALUATION ADULT - PERTINENT HX OF CURRENT PROBLEM, REHAB EVAL
Pt admitted to  secondary to syncopal episode and bradycardia. CT head: neg. Troponins: 8/4: 0.854, 8/5: 3.700, 8/5: 3.700. COVID 19: neg.

## 2020-08-05 NOTE — CONSULT NOTE ADULT - SUBJECTIVE AND OBJECTIVE BOX
80y  Female  No Known Allergies    CC: Patient is a 80y old  Female who presented with a chief complaint of Syncope     HPI:  80 year old female with no significant PMHx who presented to the ER after a witnessed syncopal episode by her son. Patient does not recall events surrounding loss of consciousness . I was called for consult at 0130 by ER MD for bradycardia on pressors, I found her fully alert and oriented x3 on extremely low dose of levophed which was started about 2230. Apparently she was Sinus bradycardia on arrival with heart rate ranging in the high 30's to high 50's and on arrival Im unsure of her initial blood pressure       She reported weeks of intermittent chest discomfort and states she has not been drinking PO fluids regularly and her son has been " bugging me about that"  She is fully alert and oriented from arrival to present. She has no complaints at this time and denies palpitations, dizziness, sob, abdominal pain, nausea or vomiting. The Levophed was stopped and systolic blood pressures are holding in the high 80's to low 90's,( MAP 58-66)  heart rate is ranging high 40's to low 60's ( sinus bradycardia)  I performed bedside POCUS which found normal IVC, no signs of collapse with respiratory variation ( note she has received 4 liters in ER) she has not made much urine ( will recommend bladder scan). Her ventricles via POCUS in parasternal long and apical 4 views do not appear to have depressed function and have good intraventricular volume.      PAST MEDICAL & SURGICAL HISTORY:  Deformity of toe of right foot  No pertinent past medical history    FAMILY HISTORY: none     Vital Signs Last 24 Hrs  T(C): 36.6 (04 Aug 2020 19:07), Max: 36.6 (04 Aug 2020 19:07)  T(F): 97.8 (04 Aug 2020 19:07), Max: 97.8 (04 Aug 2020 19:07)  HR: 48 (05 Aug 2020 02:22) (39 - 70)  BP: 90/47 (05 Aug 2020 02:22) (78/49 - 105/49)  BP(mean): 62 (05 Aug 2020 02:22) (61 - 79)  RR: 17 (05 Aug 2020 02:22) (16 - 18)  SpO2: 98% (05 Aug 2020 02:22) (94% - 100%)      LABS  08-04    143  |  106  |  18  ----------------------------<  141<H>  4.1   |  31  |  0.73    Ca    9.6      04 Aug 2020 19:31  Mg     2.7     08-04                        13.5   8.48  )-----------( 434      ( 04 Aug 2020 19:31 )             42.9     PT/INR - ( 04 Aug 2020 19:31 )   PT: 12.9 sec;   INR: 1.11 ratio    PTT - ( 04 Aug 2020 19:31 )  PTT:28.0 sec  CARDIAC MARKERS ( 04 Aug 2020 19:31 )  0.584 ng/mL / x     / x     / x     / x          MEDICATIONS  (STANDING):  chlorhexidine 4% Liquid 1 Application(s) Topical <User Schedule>  heparin   Injectable 5000 Unit(s) SubCutaneous every 12 hours  sodium chloride 0.9%. 1000 milliLiter(s) (100 mL/Hr) IV Continuous <Continuous>      REVIEW OF SYSTEMS:    CONSTITUTIONAL: No fever, weight loss, or fatigue  EYES: No eye pain, visual disturbances, or discharge  ENMT:  No difficulty hearing, tinnitus, vertigo; No sinus or throat pain  RESPIRATORY: No cough, wheezing, chills or hemoptysis; No shortness of breath  CARDIOVASCULAR: currently No chest pain, palpitations, dizziness, or leg swelling  GASTROINTESTINAL: No abdominal or epigastric pain. No nausea, vomiting, or hematemesis; No diarrhea or constipation. No melena or hematochezia.  GENITOURINARY: No dysuria, frequency, hematuria, or incontinence  NEUROLOGICAL: No headaches, memory loss, loss of strength, numbness, or tremors  SKIN: No itching, burning, rashes, or lesions   LYMPH NODES: No enlarged glands  ENDOCRINE: No heat or cold intolerance; No hair loss  MUSCULOSKELETAL: No joint pain or swelling; No muscle, back, or extremity pain  PSYCHIATRIC: No depression, anxiety, mood swings, or difficulty sleeping  HEME/LYMPH: No easy bruising, or bleeding gums  ALLERY AND IMMUNOLOGIC: No hives or eczema      Physicial Exam:     Constitutional: NAD, well-groomed, well-developed  HEENT: PERRLA, EOMI, no drainage or redness  Neck: No bruits; no thyromegaly or nodules,  No JVD  Respiratory: Breath Sounds equal & clear to percussion & auscultation, no accessory muscle use  Cardiovascular: Regular rate & rhythm, normal S1, S2; no murmurs, gallops or rubs; no S3, S4  Gastrointestinal: Soft, non-tender, non distended no hepatosplenomegaly, normal bowel sounds  Extremities: No peripheral edema, No cyanosis, clubbing   Vascular: Equal and normal pulses: 2+ peripheral pulses throughout  Neurological: GCS:    A&O x 3; no sensory, motor  deficits, normal reflexes  Psychiatric: Normal mood, normal affect  Musculoskeletal: No joint pain, swelling or deformity; no limitation of movement  Skin: No rashes

## 2020-08-05 NOTE — PHYSICAL THERAPY INITIAL EVALUATION ADULT - PLANNED THERAPY INTERVENTIONS, PT EVAL
bed mobility training/strengthening/Increase mobility as tolerated. Eval, transfers, bed mobility x 15'./transfer training/ROM

## 2020-08-05 NOTE — PROGRESS NOTE ADULT - SUBJECTIVE AND OBJECTIVE BOX
critical care follow up    HPI:       80 year old female patient presented to the ED after a witnessed syncopal episode. Patient does not recall events surrounding loss of consciousness but states she was notified by her  that she appeared as though she was about to fall but was caught by her  and son before going limp. Patient with a few months long history of chest pain  came to hospital after near syncopal event.  In ed was bradycardic and hypotension and was started on levophed.  Patient had positive troponin and was taken to cath lab  Patient had 100% RCA occlusion, and got stent, with insertion of IABP  Post procedure patient with no chest pain, not short of breathe, hr, 40- 50 with stable blood pressor    ROS no chest pain, no headache, no nausea, no abdominal pain , no leg pain,          no hx. diabetes,           ros otherwise negative     MEDICATIONS  (STANDING):  atorvastatin 40 milliGRAM(s) Oral at bedtime  chlorhexidine 4% Liquid 1 Application(s) Topical <User Schedule>  heparin  Infusion 700 Unit(s)/Hr (7 mL/Hr) IV Continuous <Continuous>  sodium chloride 0.9%. 1000 milliLiter(s) (100 mL/Hr) IV Continuous <Continuous>  sodium chloride 0.9%. 1000 milliLiter(s) (100 mL/Hr) IV Continuous <Continuous>    MEDICATIONS  (PRN):  acetaminophen   Tablet .. 650 milliGRAM(s) Oral every 4 hours PRN Mild Pain (1 - 3)  atropine Injectable 0.5 milliGRAM(s) IV Push once PRN for symptomatic bradycardia  ondansetron Injectable 4 milliGRAM(s) IV Push every 6 hours PRN Nausea    Weight (kg): 45.4 (08-04 @ 19:07)    ICU Vital Signs Last 24 Hrs  T(C): 36.6 (05 Aug 2020 04:29), Max: 36.7 (05 Aug 2020 03:00)  T(F): 97.9 (05 Aug 2020 04:29), Max: 98 (05 Aug 2020 03:00)  HR: 55 (05 Aug 2020 09:00) (39 - 70)  BP: 106/64 (05 Aug 2020 09:00) (51/43 - 106/64)  BP(mean): 73 (05 Aug 2020 09:00) (45 - 79)  ABP: --  ABP(mean): --  RR: 16 (05 Aug 2020 09:00) (12 - 18)  SpO2: 95% (05 Aug 2020 09:00) (94% - 100%)    I&O's Summary    04 Aug 2020 07:01  -  05 Aug 2020 07:00  --------------------------------------------------------  IN: 0 mL / OUT: 200 mL / NET: -200 mL                          13.5   8.48  )-----------( 434      ( 04 Aug 2020 19:31 )             42.9       08-04    143  |  106  |  18  ----------------------------<  141<H>  4.1   |  31  |  0.73    Ca    9.6      04 Aug 2020 19:31  Mg     2.7     08-04      CARDIAC MARKERS ( 05 Aug 2020 02:25 )  3.700 ng/mL / x     / x     / x     / x      CARDIAC MARKERS ( 04 Aug 2020 19:31 )  0.584 ng/mL / x     / x     / x     / x          DVT Prophylaxis:   IV Heparin                                                              Advanced Directives: Full COde

## 2020-08-05 NOTE — PHYSICAL THERAPY INITIAL EVALUATION ADULT - STANDING BALANCE: STATIC
----- Message from Christi Magana MD sent at 6/18/2020  8:33 AM CDT -----  a1c higher. Pt still prediabetic but VERY close to diabetes. Recommend diabetes education for prediabetes. Encourage limiting carbs and refined sugars. Recommend exercise 150 min per week. Recheck a1c 3 mo dx prediabetes    Chol is better but still high. Recommend increase pravastatin to 20mg daily. Call if worsening myalgias and then we can decrease dose again if intolerant  The 10-year ASCVD risk score (Marcio KWASI Jr., et al., 2013) is: 9.8%    Values used to calculate the score:      Age: 66 years      Sex: Female      Is Non- : No      Diabetic: No      Tobacco smoker: No      Systolic Blood Pressure: 150 mmHg      Is BP treated: No      HDL Cholesterol: 44 mg/dL      Total Cholesterol: 236 mg/dL    
Message left with male voice for patient to call back.  
Patient returns call and notified of message below. Patient agreeable to increase pravastatin, prescription sent to pharmacy. Patient declines diabetic education at this time. Lab order placed for A1c in 3 months  
Tried calling pt to inform her of message below but voicemail is not set up. Will try again at a later date.  
fair balance/RW

## 2020-08-05 NOTE — CHART NOTE - NSCHARTNOTEFT_GEN_A_CORE
Patient is a 80y old  Female who presents with a chief complaint of Symptomatic bradycardia, Syncope s/p LHC with DESTINY to RCA, IABP for support   Patient status post cath via RFA, closed via perclose. Site clean, dry, intact. Pulses present, capillary refill appropriate.  no signs of hematoma present, surrounding area soft. VSS no c/o pain. Patient resting comfortably in bed  IABP in LFA site is soft

## 2020-08-06 LAB
ANION GAP SERPL CALC-SCNC: 3 MMOL/L — LOW (ref 5–17)
BASOPHILS # BLD AUTO: 0.05 K/UL — SIGNIFICANT CHANGE UP (ref 0–0.2)
BASOPHILS NFR BLD AUTO: 0.7 % — SIGNIFICANT CHANGE UP (ref 0–2)
BUN SERPL-MCNC: 9 MG/DL — SIGNIFICANT CHANGE UP (ref 7–23)
CALCIUM SERPL-MCNC: 7.8 MG/DL — LOW (ref 8.5–10.1)
CHLORIDE SERPL-SCNC: 118 MMOL/L — HIGH (ref 96–108)
CO2 SERPL-SCNC: 25 MMOL/L — SIGNIFICANT CHANGE UP (ref 22–31)
CREAT SERPL-MCNC: 0.49 MG/DL — LOW (ref 0.5–1.3)
EOSINOPHIL # BLD AUTO: 0.1 K/UL — SIGNIFICANT CHANGE UP (ref 0–0.5)
EOSINOPHIL NFR BLD AUTO: 1.4 % — SIGNIFICANT CHANGE UP (ref 0–6)
GLUCOSE SERPL-MCNC: 85 MG/DL — SIGNIFICANT CHANGE UP (ref 70–99)
HCT VFR BLD CALC: 33.9 % — LOW (ref 34.5–45)
HGB BLD-MCNC: 10.8 G/DL — LOW (ref 11.5–15.5)
IMM GRANULOCYTES NFR BLD AUTO: 0.3 % — SIGNIFICANT CHANGE UP (ref 0–1.5)
LYMPHOCYTES # BLD AUTO: 0.67 K/UL — LOW (ref 1–3.3)
LYMPHOCYTES # BLD AUTO: 9.4 % — LOW (ref 13–44)
MCHC RBC-ENTMCNC: 28.8 PG — SIGNIFICANT CHANGE UP (ref 27–34)
MCHC RBC-ENTMCNC: 31.9 GM/DL — LOW (ref 32–36)
MCV RBC AUTO: 90.4 FL — SIGNIFICANT CHANGE UP (ref 80–100)
MONOCYTES # BLD AUTO: 0.62 K/UL — SIGNIFICANT CHANGE UP (ref 0–0.9)
MONOCYTES NFR BLD AUTO: 8.7 % — SIGNIFICANT CHANGE UP (ref 2–14)
NEUTROPHILS # BLD AUTO: 5.7 K/UL — SIGNIFICANT CHANGE UP (ref 1.8–7.4)
NEUTROPHILS NFR BLD AUTO: 79.5 % — HIGH (ref 43–77)
PLATELET # BLD AUTO: 258 K/UL — SIGNIFICANT CHANGE UP (ref 150–400)
POTASSIUM SERPL-MCNC: 4 MMOL/L — SIGNIFICANT CHANGE UP (ref 3.5–5.3)
POTASSIUM SERPL-SCNC: 4 MMOL/L — SIGNIFICANT CHANGE UP (ref 3.5–5.3)
RBC # BLD: 3.75 M/UL — LOW (ref 3.8–5.2)
RBC # FLD: 14.8 % — HIGH (ref 10.3–14.5)
SODIUM SERPL-SCNC: 146 MMOL/L — HIGH (ref 135–145)
WBC # BLD: 7.16 K/UL — SIGNIFICANT CHANGE UP (ref 3.8–10.5)
WBC # FLD AUTO: 7.16 K/UL — SIGNIFICANT CHANGE UP (ref 3.8–10.5)

## 2020-08-06 PROCEDURE — 93010 ELECTROCARDIOGRAM REPORT: CPT

## 2020-08-06 PROCEDURE — 99233 SBSQ HOSP IP/OBS HIGH 50: CPT

## 2020-08-06 PROCEDURE — 99232 SBSQ HOSP IP/OBS MODERATE 35: CPT

## 2020-08-06 RX ORDER — SODIUM CHLORIDE 9 MG/ML
1000 INJECTION, SOLUTION INTRAVENOUS
Refills: 0 | Status: DISCONTINUED | OUTPATIENT
Start: 2020-08-06 | End: 2020-08-07

## 2020-08-06 RX ADMIN — CLOPIDOGREL BISULFATE 75 MILLIGRAM(S): 75 TABLET, FILM COATED ORAL at 09:33

## 2020-08-06 RX ADMIN — Medication 81 MILLIGRAM(S): at 09:33

## 2020-08-06 RX ADMIN — CHLORHEXIDINE GLUCONATE 1 APPLICATION(S): 213 SOLUTION TOPICAL at 06:40

## 2020-08-06 RX ADMIN — SODIUM CHLORIDE 100 MILLILITER(S): 9 INJECTION, SOLUTION INTRAVENOUS at 10:54

## 2020-08-06 RX ADMIN — SODIUM CHLORIDE 100 MILLILITER(S): 9 INJECTION, SOLUTION INTRAVENOUS at 17:54

## 2020-08-06 RX ADMIN — ATORVASTATIN CALCIUM 40 MILLIGRAM(S): 80 TABLET, FILM COATED ORAL at 21:09

## 2020-08-06 NOTE — DIETITIAN INITIAL EVALUATION ADULT. - OTHER INFO
80 year old female patient presented to the ED after a witnessed syncopal episode. Patient does not recall events surrounding loss of consciousness but states she was notified by her  that she appeared as though she was about to fall but was caught by her  and son before going limp. Patient with months long history of chest pain but denies any palpitations, dizziness, sob, peripheral edema, abdominal pain, nausea or vomiting. NSTEMI s/p thrombectomy s/p IABP, s/p mid RCA stent x 1 pt to follow up with Dr. Jesus in 4-7 days. +Hypernatremia IVF switched to 1/2 NS. Possible d/c once cleared by cardio. Pt reports no specific diet followed PTA. Reports fluctuating po intake daily however has a good appetite. Stated # x 4 weeks ago indicating a 5# weight loss/ 5% (significant non intentional weight loss noted). Input: Normal saline IVF-total x 24hr 5425ml, Heprin drip 251ml. Output: voided 1950ml, last BM documented 8/5 x 1 day ago. No edema documented. Will continue to monitor and follow up prn. Pt meets criteria for severe protein/calorie malnutrition in context of acute illness. See below for recommendations.

## 2020-08-06 NOTE — CHART NOTE - NSCHARTNOTEFT_GEN_A_CORE
Nurse Practitioner Progress note:   s/p PCI  and IABP insertion for hemodynamic support. Placed on 2:1 support overnight. Denies CP, SOB.     A+Ox3  CV: S1s2 reg  Left groin site with IABP; no bleeding, no hematoma, site soft, non tender, positive pedal pulses bilaterally        T(C): 36.4 (08-06-20 @ 06:27), Max: 36.8 (08-05-20 @ 16:38)  HR: 66 (08-06-20 @ 09:00) (43 - 72)  BP: 136/50 (08-06-20 @ 09:00) (77/66 - 136/50)  RR: 19 (08-06-20 @ 09:00) (15 - 23)  SpO2: 93% (08-06-20 @ 09:00) (84% - 100%)    Tele: NSR with PVCs  EKG: NSR -no acute change from post PCI EKG    CBC Full  -  ( 06 Aug 2020 06:51 )  WBC Count : 7.16 K/uL  RBC Count : 3.75 M/uL  Hemoglobin : 10.8 g/dL  Hematocrit : 33.9 %  Platelet Count - Automated : 258 K/uL  Mean Cell Volume : 90.4 fl  Mean Cell Hemoglobin : 28.8 pg  Mean Cell Hemoglobin Concentration : 31.9 gm/dL  Auto Neutrophil # : 5.70 K/uL  Auto Lymphocyte # : 0.67 K/uL  Auto Monocyte # : 0.62 K/uL  Auto Eosinophil # : 0.10 K/uL  Auto Basophil # : 0.05 K/uL  Auto Neutrophil % : 79.5 %  Auto Lymphocyte % : 9.4 %  Auto Monocyte % : 8.7 %  Auto Eosinophil % : 1.4 %  Auto Basophil % : 0.7 %    08-06    146<H>  |  118<H>  |  9   ----------------------------<  85  4.0   |  25  |  0.49<L>    Ca    7.8<L>      06 Aug 2020 06:51  Phos  3.1     08-05  Mg     2.0     08-05    TPro  4.8<L>  /  Alb  2.3<L>  /  TBili  0.3  /  DBili  x   /  AST  170<H>  /  ALT  167<H>  /  AlkPhos  71  08-05    CARDIAC MARKERS ( 05 Aug 2020 17:58 )  25.400 ng/mL / x     / x     / x     / x      CARDIAC MARKERS ( 05 Aug 2020 10:19 )  18.400 ng/mL / x     / x     / x     / x      CARDIAC MARKERS ( 05 Aug 2020 02:25 )  3.700 ng/mL / x     / x     / x     / x      CARDIAC MARKERS ( 04 Aug 2020 19:31 )  0.584 ng/mL / x     / x     / x     / x              MEDICATIONS  (STANDING):  aspirin  chewable 81 milliGRAM(s) Chew daily  atorvastatin 40 milliGRAM(s) Oral at bedtime  chlorhexidine 4% Liquid 1 Application(s) Topical <User Schedule>  clopidogrel Tablet 75 milliGRAM(s) Oral daily  sodium chloride 0.9%. 1000 milliLiter(s) (100 mL/Hr) IV Continuous <Continuous>  sodium chloride 0.9%. 1000 milliLiter(s) (150 mL/Hr) IV Continuous <Continuous>    MEDICATIONS  (PRN):  acetaminophen   Tablet .. 650 milliGRAM(s) Oral every 4 hours PRN Mild Pain (1 - 3)  atropine Injectable 0.5 milliGRAM(s) IV Push once PRN for symptomatic bradycardia  ondansetron Injectable 4 milliGRAM(s) IV Push every 6 hours PRN Nausea        HPI:  80 year old female patient presented to the ED after a witnessed syncopal episode. Patient does not recall events surrounding loss of consciousness but states she was notified by her  that she appeared as though she was about to fall but was caught by her  and son before going limp. Patient with months long history of chest pain but denies any palpitations, dizziness, sob, peripheral edema, abdominal pain, nausea or vomiting.      In the ED patient with sinus bradycardia on telemetry ranging from mid 30s to 50s with occasional PVC. Patient was started on levophed by ED provider. (05 Aug 2020 01:08)  Levophed now discontinued     s/p IABP and PCI of RCA on 8/5    ASSESSMENT/PLAN:    Coronary artery disease/ NSTEMI    -Aspirin 81mg PO daily  -Plavix 75mg PO daily  -atorvastatin 40mg PO QHS   -Plan of care discussed with patient and  MD  -D/C  Heparih  -Remove IABP 2 hours after heparin discontinuation  -Discussed therapeutic lifestyle changes to reduce risk factors such as following a cardiac diet, weight loss, maintaining a healthy weight, exercise, smoking cessation, medication compliance, and regular follow-up  with MD Nurse Practitioner Progress note:   s/p PCI  and IABP insertion for hemodynamic support. Placed on 1:2 support overnight. Denies CP, SOB.     A+Ox3  CV: S1s2 reg  Left groin site with IABP; no bleeding, no hematoma, site soft, non tender, positive pedal pulses bilaterally        T(C): 36.4 (08-06-20 @ 06:27), Max: 36.8 (08-05-20 @ 16:38)  HR: 66 (08-06-20 @ 09:00) (43 - 72)  BP: 136/50 (08-06-20 @ 09:00) (77/66 - 136/50)  RR: 19 (08-06-20 @ 09:00) (15 - 23)  SpO2: 93% (08-06-20 @ 09:00) (84% - 100%)    Tele: NSR with PVCs  EKG: NSR -no acute change from post PCI EKG    CBC Full  -  ( 06 Aug 2020 06:51 )  WBC Count : 7.16 K/uL  RBC Count : 3.75 M/uL  Hemoglobin : 10.8 g/dL  Hematocrit : 33.9 %  Platelet Count - Automated : 258 K/uL  Mean Cell Volume : 90.4 fl  Mean Cell Hemoglobin : 28.8 pg  Mean Cell Hemoglobin Concentration : 31.9 gm/dL  Auto Neutrophil # : 5.70 K/uL  Auto Lymphocyte # : 0.67 K/uL  Auto Monocyte # : 0.62 K/uL  Auto Eosinophil # : 0.10 K/uL  Auto Basophil # : 0.05 K/uL  Auto Neutrophil % : 79.5 %  Auto Lymphocyte % : 9.4 %  Auto Monocyte % : 8.7 %  Auto Eosinophil % : 1.4 %  Auto Basophil % : 0.7 %    08-06    146<H>  |  118<H>  |  9   ----------------------------<  85  4.0   |  25  |  0.49<L>    Ca    7.8<L>      06 Aug 2020 06:51  Phos  3.1     08-05  Mg     2.0     08-05    TPro  4.8<L>  /  Alb  2.3<L>  /  TBili  0.3  /  DBili  x   /  AST  170<H>  /  ALT  167<H>  /  AlkPhos  71  08-05    CARDIAC MARKERS ( 05 Aug 2020 17:58 )  25.400 ng/mL / x     / x     / x     / x      CARDIAC MARKERS ( 05 Aug 2020 10:19 )  18.400 ng/mL / x     / x     / x     / x      CARDIAC MARKERS ( 05 Aug 2020 02:25 )  3.700 ng/mL / x     / x     / x     / x      CARDIAC MARKERS ( 04 Aug 2020 19:31 )  0.584 ng/mL / x     / x     / x     / x              MEDICATIONS  (STANDING):  aspirin  chewable 81 milliGRAM(s) Chew daily  atorvastatin 40 milliGRAM(s) Oral at bedtime  chlorhexidine 4% Liquid 1 Application(s) Topical <User Schedule>  clopidogrel Tablet 75 milliGRAM(s) Oral daily  sodium chloride 0.9%. 1000 milliLiter(s) (100 mL/Hr) IV Continuous <Continuous>  sodium chloride 0.9%. 1000 milliLiter(s) (150 mL/Hr) IV Continuous <Continuous>    MEDICATIONS  (PRN):  acetaminophen   Tablet .. 650 milliGRAM(s) Oral every 4 hours PRN Mild Pain (1 - 3)  atropine Injectable 0.5 milliGRAM(s) IV Push once PRN for symptomatic bradycardia  ondansetron Injectable 4 milliGRAM(s) IV Push every 6 hours PRN Nausea        HPI:  80 year old female patient presented to the ED after a witnessed syncopal episode. Patient does not recall events surrounding loss of consciousness but states she was notified by her  that she appeared as though she was about to fall but was caught by her  and son before going limp. Patient with months long history of chest pain but denies any palpitations, dizziness, sob, peripheral edema, abdominal pain, nausea or vomiting.      In the ED patient with sinus bradycardia on telemetry ranging from mid 30s to 50s with occasional PVC. Patient was started on levophed by ED provider. (05 Aug 2020 01:08)  Levophed now discontinued     s/p IABP and PCI of RCA on 8/5    ASSESSMENT/PLAN:    Coronary artery disease/ NSTEMI    -Aspirin 81mg PO daily  -Plavix 75mg PO daily  -atorvastatin 40mg PO QHS   -Plan of care discussed with patient and  MD  -D/C  Heparih  -Remove IABP 2 hours after heparin discontinuation  -Discussed therapeutic lifestyle changes to reduce risk factors such as following a cardiac diet, weight loss, maintaining a healthy weight, exercise, smoking cessation, medication compliance, and regular follow-up  with MD

## 2020-08-06 NOTE — DIETITIAN INITIAL EVALUATION ADULT. - PERTINENT MEDS FT
MEDICATIONS  (STANDING):  aspirin  chewable 81 milliGRAM(s) Chew daily  atorvastatin 40 milliGRAM(s) Oral at bedtime  chlorhexidine 4% Liquid 1 Application(s) Topical <User Schedule>  clopidogrel Tablet 75 milliGRAM(s) Oral daily  sodium chloride 0.45%. 1000 milliLiter(s) (100 mL/Hr) IV Continuous <Continuous>    MEDICATIONS  (PRN):  acetaminophen   Tablet .. 650 milliGRAM(s) Oral every 4 hours PRN Mild Pain (1 - 3)  atropine Injectable 0.5 milliGRAM(s) IV Push once PRN for symptomatic bradycardia  ondansetron Injectable 4 milliGRAM(s) IV Push every 6 hours PRN Nausea

## 2020-08-06 NOTE — PROGRESS NOTE ADULT - SUBJECTIVE AND OBJECTIVE BOX
CHIEF COMPLAINT:    HPI:  80 year old female patient presented to the ED after a witnessed syncopal episode.  patient was noted to have intermittent hypotension   episodes of bradycardia , noted to have borderline elevated ST , subsequent cardiac cath   RCA  stent , patient was placed on IABP   patient is still on support 2:1   patient denies any chest pain or shortness of breath or palpitation         PAST MEDICAL & SURGICAL HISTORY:  Deformity of toe of right foot  No pertinent past medical history      Allergies    No Known Allergies    Intolerances        MEDICATIONS:    MEDICATIONS  (STANDING):  aspirin  chewable 81 milliGRAM(s) Chew daily  atorvastatin 40 milliGRAM(s) Oral at bedtime  chlorhexidine 4% Liquid 1 Application(s) Topical <User Schedule>  clopidogrel Tablet 75 milliGRAM(s) Oral daily  heparin  Infusion 700 Unit(s)/Hr (7 mL/Hr) IV Continuous <Continuous>  sodium chloride 0.9%. 1000 milliLiter(s) (100 mL/Hr) IV Continuous <Continuous>  sodium chloride 0.9%. 1000 milliLiter(s) (150 mL/Hr) IV Continuous <Continuous>    MEDICATIONS  (PRN):  acetaminophen   Tablet .. 650 milliGRAM(s) Oral every 4 hours PRN Mild Pain (1 - 3)  atropine Injectable 0.5 milliGRAM(s) IV Push once PRN for symptomatic bradycardia  ondansetron Injectable 4 milliGRAM(s) IV Push every 6 hours PRN Nausea      REVIEW OF SYSTEMS:    CONSTITUTIONAL: No weakness, fevers or chills  EYES/ENT: No visual changes;  No vertigo or throat pain   NECK: No pain or stiffness  RESPIRATORY: No cough, wheezing, hemoptysis; No shortness of breath  CARDIOVASCULAR: No chest pain or palpitations  GASTROINTESTINAL: No abdominal or epigastric pain. No nausea, vomiting, or hematemesis; No diarrhea or constipation. No melena or hematochezia.  GENITOURINARY: No dysuria, frequency or hematuria  NEUROLOGICAL: No numbness or weakness  SKIN: No itching, burning, rashes, or lesions   All other review of systems is negative unless indicated above    Vital Signs Last 24 Hrs  T(C): 36.4 (06 Aug 2020 06:27), Max: 36.8 (05 Aug 2020 16:38)  T(F): 97.6 (06 Aug 2020 06:27), Max: 98.3 (05 Aug 2020 16:38)  HR: 59 (06 Aug 2020 08:00) (43 - 72)  BP: 134/44 (06 Aug 2020 07:00) (77/66 - 134/44)  BP(mean): 67 (06 Aug 2020 07:00) (49 - 76)  RR: 18 (06 Aug 2020 08:00) (15 - 23)  SpO2: 94% (06 Aug 2020 08:00) (84% - 100%)    I&O's Summary    05 Aug 2020 07:01  -  06 Aug 2020 07:00  --------------------------------------------------------  IN: 6026 mL / OUT: 1950 mL / NET: 4076 mL        PHYSICAL EXAM:    Constitutional: NAD, awake and alert, well-developed  HEENT: PERR, EOMI,  No oral cyananosis.  Neck:  supple,  No JVD  Respiratory: Breath sounds are clear bilaterally, No wheezing, rales or rhonchi  Cardiovascular: S1 and S2, regular rate and rhythm, no Murmurs, gallops or rubs  Gastrointestinal: Bowel Sounds present, soft, nontender.   Extremities: No peripheral edema. No clubbing or cyanosis.  Vascular: 2+ peripheral pulses  left groin access for  IABP, puncture site   no hematoma   Neurological: A/O x 3, no focal deficits  Musculoskeletal: no calf tenderness.  Skin: No rashes.      LABS: All Labs Reviewed:                        10.8   7.16  )-----------( 258      ( 06 Aug 2020 06:51 )             33.9                         10.2   7.45  )-----------( 269      ( 05 Aug 2020 21:01 )             32.2                         10.8   7.71  )-----------( 296      ( 05 Aug 2020 10:19 )             34.5     06 Aug 2020 06:51    146    |  118    |  9      ----------------------------<  85     4.0     |  25     |  0.49   05 Aug 2020 21:01    144    |  116    |  11     ----------------------------<  113    3.7     |  23     |  0.53   05 Aug 2020 10:19    143    |  114    |  13     ----------------------------<  90     4.0     |  22     |  0.65     Ca    7.8        06 Aug 2020 06:51  Ca    7.5        05 Aug 2020 21:01  Ca    7.5        05 Aug 2020 10:19  Phos  3.1       05 Aug 2020 10:19  Mg     2.0       05 Aug 2020 10:19  Mg     2.7       04 Aug 2020 19:31    TPro  4.8    /  Alb  2.3    /  TBili  0.3    /  DBili  x      /  AST  170    /  ALT  167    /  AlkPhos  71     05 Aug 2020 21:01  TPro  5.1    /  Alb  2.4    /  TBili  0.5    /  DBili  x      /  AST  220    /  ALT  210    /  AlkPhos  76     05 Aug 2020 10:19    PT/INR - ( 05 Aug 2020 10:19 )   PT: 17.6 sec;   INR: 1.55 ratio         PTT - ( 05 Aug 2020 15:42 )  PTT:64.2 sec  CARDIAC MARKERS ( 05 Aug 2020 17:58 )  25.400 ng/mL / x     / x     / x     / x      CARDIAC MARKERS ( 05 Aug 2020 10:19 )  18.400 ng/mL / x     / x     / x     / x      CARDIAC MARKERS ( 05 Aug 2020 02:25 )  3.700 ng/mL / x     / x     / x     / x      CARDIAC MARKERS ( 04 Aug 2020 19:31 )  0.584 ng/mL / x     / x     / x     / x          Blood Culture:     08-05 @ 10:19  TSH: 0.92      RADIOLOGY/EKG: sinus rhythm low qrs voltage       < from: Cardiac Cath Lab - Adult (08.05.20 @ 07:21) >   Cardiac Arteries and Lesion Findings    LMCA: Diffuse irregularity.There is mild diffuse disease noted.    LAD: Diffuse irregularity.There is mild diffuse disease noted.    LCx: Diffuse irregularity.There is mild diffuse disease noted.    RCA: Diffuse irregularity.     Mid RCA: Distal subsection.100% stenosis 26 mm length reduced to 0%.Pre   procedure JOE 0 flow was noted. Good runoff was present.The lesion was   diagnosed as a high risk lesion.The lesion was diffuse.The lesion showed   evidence of thrombus presence, moderate angulation and mild tortuosity.     Post Procedure JOE III flow was present.     Treatment results:Interventional treatment was successful.     Comments:IVUS showed suboptimal stent apposition post deployment. It was   then post dilated with a larger balloon.     Devices used     * 6FR AR 2 LAUNCHER     * COUGAR XT 190CM     * 6F Rives AP     * Emerge     Diameter: 3 mm. Length: 20 mm. Total duration: 26 sec.2 inflation(s). to   a max pressure of: 8 CARRIE.     * Synergy DESTINY Stent     Diameter: 3 mm. Length: 32 mm. Total duration: 18 sec.1 inflation(s). to   a max pressure of: 16 CARRIE.     * EAGLE EYE PLATINUM IVUS     * NC Euphora     Diameter: 3.75 mm. Length: 12 mm. Total duration: 28 sec.2 inflation(s).   to a max pressure of: 18 CARRIE.    Valves  +------+----------------------------+----------------------------+---------+  !Valve !Stenosis                    !Insufficiency!Comments !  +------+----------------------------+----------------------------+---------+  !Aortic!No                          !Not assessed                !         !  +------+----------------------------+----------------------------+---------+  !Mitral!Not assessed                !No insufficiency            !         !  +------+----------------------------+----------------------------+---------+    LV function assessed as:Abnormal.  Ejection Fraction  +----------------------------------------------------------------------+---+  !Method                                                                !EF%!  +----------------------------------------------------------------------+---+  !LV gram           !50 !  +----------------------------------------------------------------------+---+    VA  Ventriculography Findings:  Low Normal left ventricular systolic function.     Coronary tree     Dominance: Right     Impression     Diagnostic Conclusions   1 vessel CAd with NSTEMI that progressed to RV infarct and cardiogenic   shock requiring Levophed support. IABP placed and LV FX showed EF 50%.     Interventional Conclusions     Successful PCI of RCA   IABP placed prior to intervention for cardiogenic shock     Recommendations     Manage with medical therapy.   IABP weaning off over the next 24 hrs.    Estimated Blood Loss:5ml.    Complications:  No complication.     Signatures    < end of copied text >

## 2020-08-06 NOTE — PROGRESS NOTE ADULT - SUBJECTIVE AND OBJECTIVE BOX
80 year old female patient presented to the ED after a witnessed syncopal episode. Patient does not recall events surrounding loss of consciousness but states she was notified by her  that she appeared as though she was about to fall but was caught by her  and son before going limp. Patient with months long history of chest pain but denies any palpitations, dizziness, sob, peripheral edema, abdominal pain, nausea or vomiting.    Patient continues to have low BPs in the 90s /  HRs in the 56. asymptomatic. No arrhtyhmias on tele.     HEENT:  Head is normocephalic    Skin:  Warm and dry without any rash NECK:  Supple without lymphadenopathy.   HEART:  Regular rate and rhythm. normal S1 and S2, No M/R/G  LUNGS:  Good ins/exp effort, no W/R/R/C  ABDOMEN:  Soft, nontender, nondistended with good bowel sounds heard  EXTREMITIES:  Without cyanosis, clubbing or edema.   NEUROLOGICAL:  Gross nonfocal         PHYSICAL EXAM:    Daily     Daily     ICU Vital Signs Last 24 Hrs  T(C): 36.4 (06 Aug 2020 06:27), Max: 36.8 (05 Aug 2020 16:38)  T(F): 97.6 (06 Aug 2020 06:27), Max: 98.3 (05 Aug 2020 16:38)  HR: 66 (06 Aug 2020 09:00) (43 - 72)  BP: 136/50 (06 Aug 2020 09:00) (77/66 - 136/50)  BP(mean): 72 (06 Aug 2020 09:00) (49 - 76)  ABP: --  ABP(mean): --  RR: 19 (06 Aug 2020 09:00) (15 - 23)  SpO2: 93% (06 Aug 2020 09:00) (84% - 100%)                              10.8   7.16  )-----------( 258      ( 06 Aug 2020 06:51 )             33.9       CBC Full  -  ( 06 Aug 2020 06:51 )  WBC Count : 7.16 K/uL  RBC Count : 3.75 M/uL  Hemoglobin : 10.8 g/dL  Hematocrit : 33.9 %  Platelet Count - Automated : 258 K/uL  Mean Cell Volume : 90.4 fl  Mean Cell Hemoglobin : 28.8 pg  Mean Cell Hemoglobin Concentration : 31.9 gm/dL  Auto Neutrophil # : 5.70 K/uL  Auto Lymphocyte # : 0.67 K/uL  Auto Monocyte # : 0.62 K/uL  Auto Eosinophil # : 0.10 K/uL  Auto Basophil # : 0.05 K/uL  Auto Neutrophil % : 79.5 %  Auto Lymphocyte % : 9.4 %  Auto Monocyte % : 8.7 %  Auto Eosinophil % : 1.4 %  Auto Basophil % : 0.7 %      08-06    146<H>  |  118<H>  |  9   ----------------------------<  85  4.0   |  25  |  0.49<L>    Ca    7.8<L>      06 Aug 2020 06:51  Phos  3.1     08-05  Mg     2.0     08-05    TPro  4.8<L>  /  Alb  2.3<L>  /  TBili  0.3  /  DBili  x   /  AST  170<H>  /  ALT  167<H>  /  AlkPhos  71  08-05      LIVER FUNCTIONS - ( 05 Aug 2020 21:01 )  Alb: 2.3 g/dL / Pro: 4.8 gm/dL / ALK PHOS: 71 U/L / ALT: 167 U/L / AST: 170 U/L / GGT: x             PT/INR - ( 05 Aug 2020 10:19 )   PT: 17.6 sec;   INR: 1.55 ratio         PTT - ( 05 Aug 2020 15:42 )  PTT:64.2 sec    CARDIAC MARKERS ( 05 Aug 2020 17:58 )  25.400 ng/mL / x     / x     / x     / x      CARDIAC MARKERS ( 05 Aug 2020 10:19 )  18.400 ng/mL / x     / x     / x     / x      CARDIAC MARKERS ( 05 Aug 2020 02:25 )  3.700 ng/mL / x     / x     / x     / x      CARDIAC MARKERS ( 04 Aug 2020 19:31 )  0.584 ng/mL / x     / x     / x     / x                    MEDICATIONS  (STANDING):  aspirin  chewable 81 milliGRAM(s) Chew daily  atorvastatin 40 milliGRAM(s) Oral at bedtime  chlorhexidine 4% Liquid 1 Application(s) Topical <User Schedule>  clopidogrel Tablet 75 milliGRAM(s) Oral daily  sodium chloride 0.9%. 1000 milliLiter(s) (100 mL/Hr) IV Continuous <Continuous>  sodium chloride 0.9%. 1000 milliLiter(s) (150 mL/Hr) IV Continuous <Continuous> 80 year old female patient presented to the ED after a witnessed syncopal episode. Patient does not recall events surrounding loss of consciousness but states she was notified by her  that she appeared as though she was about to fall but was caught by her  and son before going limp. Patient with months long history of chest pain but denies any palpitations, dizziness, sob, peripheral edema, abdominal pain, nausea or vomiting.    BP imroved to 136 , has IABP and IVF/  HRs in the 56. asymptomatic. No arrhtyhmias on tele.     HEENT:  Head is normocephalic    Skin:  Warm and dry without any rash NECK:  Supple without lymphadenopathy.   HEART:  Regular rate and rhythm. normal S1 and S2, No M/R/G  LUNGS:  Good ins/exp effort, no W/R/R/C  ABDOMEN:  Soft, nontender, nondistended with good bowel sounds heard  EXTREMITIES:  Without cyanosis, clubbing or edema.   NEUROLOGICAL:  Gross nonfocal         PHYSICAL EXAM:    Daily     Daily     ICU Vital Signs Last 24 Hrs  T(C): 36.4 (06 Aug 2020 06:27), Max: 36.8 (05 Aug 2020 16:38)  T(F): 97.6 (06 Aug 2020 06:27), Max: 98.3 (05 Aug 2020 16:38)  HR: 66 (06 Aug 2020 09:00) (43 - 72)  BP: 136/50 (06 Aug 2020 09:00) (77/66 - 136/50)  BP(mean): 72 (06 Aug 2020 09:00) (49 - 76)  ABP: --  ABP(mean): --  RR: 19 (06 Aug 2020 09:00) (15 - 23)  SpO2: 93% (06 Aug 2020 09:00) (84% - 100%)                              10.8   7.16  )-----------( 258      ( 06 Aug 2020 06:51 )             33.9       CBC Full  -  ( 06 Aug 2020 06:51 )  WBC Count : 7.16 K/uL  RBC Count : 3.75 M/uL  Hemoglobin : 10.8 g/dL  Hematocrit : 33.9 %  Platelet Count - Automated : 258 K/uL  Mean Cell Volume : 90.4 fl  Mean Cell Hemoglobin : 28.8 pg  Mean Cell Hemoglobin Concentration : 31.9 gm/dL  Auto Neutrophil # : 5.70 K/uL  Auto Lymphocyte # : 0.67 K/uL  Auto Monocyte # : 0.62 K/uL  Auto Eosinophil # : 0.10 K/uL  Auto Basophil # : 0.05 K/uL  Auto Neutrophil % : 79.5 %  Auto Lymphocyte % : 9.4 %  Auto Monocyte % : 8.7 %  Auto Eosinophil % : 1.4 %  Auto Basophil % : 0.7 %      08-06    146<H>  |  118<H>  |  9   ----------------------------<  85  4.0   |  25  |  0.49<L>    Ca    7.8<L>      06 Aug 2020 06:51  Phos  3.1     08-05  Mg     2.0     08-05    TPro  4.8<L>  /  Alb  2.3<L>  /  TBili  0.3  /  DBili  x   /  AST  170<H>  /  ALT  167<H>  /  AlkPhos  71  08-05      LIVER FUNCTIONS - ( 05 Aug 2020 21:01 )  Alb: 2.3 g/dL / Pro: 4.8 gm/dL / ALK PHOS: 71 U/L / ALT: 167 U/L / AST: 170 U/L / GGT: x             PT/INR - ( 05 Aug 2020 10:19 )   PT: 17.6 sec;   INR: 1.55 ratio         PTT - ( 05 Aug 2020 15:42 )  PTT:64.2 sec    CARDIAC MARKERS ( 05 Aug 2020 17:58 )  25.400 ng/mL / x     / x     / x     / x      CARDIAC MARKERS ( 05 Aug 2020 10:19 )  18.400 ng/mL / x     / x     / x     / x      CARDIAC MARKERS ( 05 Aug 2020 02:25 )  3.700 ng/mL / x     / x     / x     / x      CARDIAC MARKERS ( 04 Aug 2020 19:31 )  0.584 ng/mL / x     / x     / x     / x                    MEDICATIONS  (STANDING):  aspirin  chewable 81 milliGRAM(s) Chew daily  atorvastatin 40 milliGRAM(s) Oral at bedtime  chlorhexidine 4% Liquid 1 Application(s) Topical <User Schedule>  clopidogrel Tablet 75 milliGRAM(s) Oral daily  sodium chloride 0.9%. 1000 milliLiter(s) (100 mL/Hr) IV Continuous <Continuous>  sodium chloride 0.9%. 1000 milliLiter(s) (150 mL/Hr) IV Continuous <Continuous>

## 2020-08-06 NOTE — PROGRESS NOTE ADULT - ASSESSMENT
# NSTEMI: s/p thrombectomy, s/p IABP, s/p mid RCA stent x1   - DAPT / STATIN  - maintain IABP, consider to dc tomorrow if pt stable   - Heparin 700 unit/ hr  while on IABP, plan for IABP removal today  monitor for 1 more day   - follow up with Dr. Jesus in 4-7 days    # bradycardic / hypotensive secondary to ischemia  avoid AVN blocking agents   - s/p cath   - off pressors  - IABP       # Hypernatremia   switch IVF to 1/2 NS    dispo- discharge planning once cleared by cardio

## 2020-08-06 NOTE — DIETITIAN INITIAL EVALUATION ADULT. - PHYSICAL APPEARANCE
other (specify) NFPE: Moderate muscle wasting: Temporal, clavicle, scapula, deltoid, interosseous, Mild muscle wasting: Patellas.  Moderate fat loss: ocular, buccal, triceps.  Skin intact with no edema documented. Sujit score 16 (high risk for skin breakdown)

## 2020-08-06 NOTE — DIETITIAN INITIAL EVALUATION ADULT. - PERTINENT LABORATORY DATA
08-06 Na146 mmol/L<H> Glu 85 mg/dL K+ 4.0 mmol/L Cr  0.49 mg/dL<L> BUN 9 mg/dL Phos n/a   Alb n/a   PAB n/a

## 2020-08-06 NOTE — CHART NOTE - NSCHARTNOTEFT_GEN_A_CORE
s/p MI/PCI of RCA with IABP insertion on 8/5/2020.  IABP removed & direct pressure applied for 30min in Lt femoral A.  Good hemostasis obtained.  Lt hallie : soft, NT, no hematoma, 2+ Lt femoral & DP.  Pt is alert & denies any discomfort.  VSS.  Keep pt BR for 6 hrs.

## 2020-08-06 NOTE — PROGRESS NOTE ADULT - PROBLEM SELECTOR PLAN 1
? borderline ST elevation   s/p RCA stent , with hypotension on IABP support , improving blood pressure , mild hypernatremia , continue antiplatelet drugs  will add statin , lipid profile , EF 50% .

## 2020-08-06 NOTE — CHART NOTE - NSCHARTNOTEFT_GEN_A_CORE
Cardiac Rehab recommended to pt who would like to think about it before making a decision . Written information on cardiac rehab given to patient.

## 2020-08-06 NOTE — CHART NOTE - NSCHARTNOTEFT_GEN_A_CORE
Upon Nutritional Assessment by the Registered Dietitian your patient was determined to meet criteria / has evidence of the following diagnosis/diagnoses:          [ ]  Mild Protein Calorie Malnutrition        [ ]  Moderate Protein Calorie Malnutrition        [x] Severe Protein Calorie Malnutrition        [ ] Unspecified Protein Calorie Malnutrition        [ ] Underweight / BMI <19        [ ] Morbid Obesity / BMI > 40      Findings as based on:  •  Comprehensive nutrition assessment and consultation  •  Calorie counts (nutrient intake analysis)  •  Food acceptance and intake status from observations by staff  •  Follow up  •  Patient education  •  Intervention secondary to interdisciplinary rounds  •   concerns      *********Malnutrition severe protein/calorie malnutrition in context of acute illness.     Etiology AEB fluctuating po intake 2/2 NSTEMI.     Signs/Symptoms fluctuating intake, significant weight loss, moderate muscle/fat wasting.     Goal/Expected Outcome Optimal intake to meet >80% of ENN. Weight maintenance. Reduced s/s of malnutrition.    · Additional Recommendations  1) liberalize diet to regular 2/2 nutrition status and optimal intake.   2) Ensure enlive 8oz po TID (in between meals)   3) MVI w/ minerals to meet RDI's   4) Maintain aspiration precautions, back of bed >35 degrees.   5) encourage po fluids 2/2 hypernatremia.   6) monitor daily weights          PROVIDER Section:     By signing this assessment you are acknowledging and agree with the diagnosis/diagnoses assigned by the Registered Dietitian    Comments:

## 2020-08-06 NOTE — DIETITIAN INITIAL EVALUATION ADULT. - ADD RECOMMEND
1) liberalize diet to regular 2/2 nutrition status and optimal intake. 2) Ensure enlive 8oz po TID (in between meals) 3) MVI w/ minerals to meet RDI's 4) Maintain aspiration precautions, back of bed >35 degrees. 5) encourage po fluids 2/2 hypernatremia. 6) monitor daily weights

## 2020-08-07 DIAGNOSIS — E78.5 HYPERLIPIDEMIA, UNSPECIFIED: ICD-10-CM

## 2020-08-07 LAB
ANION GAP SERPL CALC-SCNC: 6 MMOL/L — SIGNIFICANT CHANGE UP (ref 5–17)
BASOPHILS # BLD AUTO: 0.04 K/UL — SIGNIFICANT CHANGE UP (ref 0–0.2)
BASOPHILS NFR BLD AUTO: 0.4 % — SIGNIFICANT CHANGE UP (ref 0–2)
BUN SERPL-MCNC: 7 MG/DL — SIGNIFICANT CHANGE UP (ref 7–23)
CALCIUM SERPL-MCNC: 7.8 MG/DL — LOW (ref 8.5–10.1)
CHLORIDE SERPL-SCNC: 114 MMOL/L — HIGH (ref 96–108)
CO2 SERPL-SCNC: 24 MMOL/L — SIGNIFICANT CHANGE UP (ref 22–31)
CREAT SERPL-MCNC: 0.41 MG/DL — LOW (ref 0.5–1.3)
EOSINOPHIL # BLD AUTO: 0.15 K/UL — SIGNIFICANT CHANGE UP (ref 0–0.5)
EOSINOPHIL NFR BLD AUTO: 1.5 % — SIGNIFICANT CHANGE UP (ref 0–6)
GLUCOSE SERPL-MCNC: 92 MG/DL — SIGNIFICANT CHANGE UP (ref 70–99)
HCT VFR BLD CALC: 35.8 % — SIGNIFICANT CHANGE UP (ref 34.5–45)
HGB BLD-MCNC: 11.6 G/DL — SIGNIFICANT CHANGE UP (ref 11.5–15.5)
IMM GRANULOCYTES NFR BLD AUTO: 0.3 % — SIGNIFICANT CHANGE UP (ref 0–1.5)
LYMPHOCYTES # BLD AUTO: 0.46 K/UL — LOW (ref 1–3.3)
LYMPHOCYTES # BLD AUTO: 4.6 % — LOW (ref 13–44)
MCHC RBC-ENTMCNC: 29.4 PG — SIGNIFICANT CHANGE UP (ref 27–34)
MCHC RBC-ENTMCNC: 32.4 GM/DL — SIGNIFICANT CHANGE UP (ref 32–36)
MCV RBC AUTO: 90.9 FL — SIGNIFICANT CHANGE UP (ref 80–100)
MONOCYTES # BLD AUTO: 0.68 K/UL — SIGNIFICANT CHANGE UP (ref 0–0.9)
MONOCYTES NFR BLD AUTO: 6.8 % — SIGNIFICANT CHANGE UP (ref 2–14)
NEUTROPHILS # BLD AUTO: 8.59 K/UL — HIGH (ref 1.8–7.4)
NEUTROPHILS NFR BLD AUTO: 86.4 % — HIGH (ref 43–77)
PLATELET # BLD AUTO: 331 K/UL — SIGNIFICANT CHANGE UP (ref 150–400)
POTASSIUM SERPL-MCNC: 3.4 MMOL/L — LOW (ref 3.5–5.3)
POTASSIUM SERPL-SCNC: 3.4 MMOL/L — LOW (ref 3.5–5.3)
RBC # BLD: 3.94 M/UL — SIGNIFICANT CHANGE UP (ref 3.8–5.2)
RBC # FLD: 14.5 % — SIGNIFICANT CHANGE UP (ref 10.3–14.5)
SODIUM SERPL-SCNC: 144 MMOL/L — SIGNIFICANT CHANGE UP (ref 135–145)
WBC # BLD: 9.95 K/UL — SIGNIFICANT CHANGE UP (ref 3.8–10.5)
WBC # FLD AUTO: 9.95 K/UL — SIGNIFICANT CHANGE UP (ref 3.8–10.5)

## 2020-08-07 PROCEDURE — 99232 SBSQ HOSP IP/OBS MODERATE 35: CPT

## 2020-08-07 PROCEDURE — 99233 SBSQ HOSP IP/OBS HIGH 50: CPT

## 2020-08-07 PROCEDURE — 93925 LOWER EXTREMITY STUDY: CPT | Mod: 26

## 2020-08-07 PROCEDURE — 93970 EXTREMITY STUDY: CPT | Mod: 26

## 2020-08-07 RX ORDER — POTASSIUM CHLORIDE 20 MEQ
40 PACKET (EA) ORAL ONCE
Refills: 0 | Status: DISCONTINUED | OUTPATIENT
Start: 2020-08-07 | End: 2020-08-07

## 2020-08-07 RX ORDER — POTASSIUM CHLORIDE 20 MEQ
40 PACKET (EA) ORAL ONCE
Refills: 0 | Status: COMPLETED | OUTPATIENT
Start: 2020-08-07 | End: 2020-08-07

## 2020-08-07 RX ORDER — HEPARIN SODIUM 5000 [USP'U]/ML
5000 INJECTION INTRAVENOUS; SUBCUTANEOUS EVERY 12 HOURS
Refills: 0 | Status: DISCONTINUED | OUTPATIENT
Start: 2020-08-07 | End: 2020-08-10

## 2020-08-07 RX ADMIN — CLOPIDOGREL BISULFATE 75 MILLIGRAM(S): 75 TABLET, FILM COATED ORAL at 11:13

## 2020-08-07 RX ADMIN — ATORVASTATIN CALCIUM 40 MILLIGRAM(S): 80 TABLET, FILM COATED ORAL at 21:01

## 2020-08-07 RX ADMIN — CHLORHEXIDINE GLUCONATE 1 APPLICATION(S): 213 SOLUTION TOPICAL at 11:11

## 2020-08-07 RX ADMIN — HEPARIN SODIUM 5000 UNIT(S): 5000 INJECTION INTRAVENOUS; SUBCUTANEOUS at 21:01

## 2020-08-07 RX ADMIN — Medication 81 MILLIGRAM(S): at 11:13

## 2020-08-07 RX ADMIN — Medication 650 MILLIGRAM(S): at 21:45

## 2020-08-07 RX ADMIN — Medication 40 MILLIEQUIVALENT(S): at 21:01

## 2020-08-07 RX ADMIN — Medication 650 MILLIGRAM(S): at 21:00

## 2020-08-07 NOTE — PROGRESS NOTE ADULT - SUBJECTIVE AND OBJECTIVE BOX
80 year old female patient presented to the ED after a witnessed syncopal episode. Patient does not recall events surrounding loss of consciousness but states she was notified by her  that she appeared as though she was about to fall but was caught by her  and son before going limp. Patient with months long history of chest pain but denies any palpitations, dizziness, sob, peripheral edema, abdominal pain, nausea or vomiting.    BP imroved , has IABP removed 8/7   HRs in the 56. asymptomatic. No arrhtyhmias on tele.     HEENT:  Head is normocephalic    Skin:  Warm and dry without any rash NECK:  Supple without lymphadenopathy.   HEART:  Regular rate and rhythm. normal S1 and S2, No M/R/G  LUNGS:  Good ins/exp effort, no W/R/R/C  ABDOMEN:  Soft, nontender, nondistended with good bowel sounds heard  EXTREMITIES:  Without cyanosis, clubbing or edema.   NEUROLOGICAL:  Gross nonfocal         PHYSICAL EXAM:    Daily Height in cm: 160.02 (07 Aug 2020 11:00)    Daily Weight in k.6 (07 Aug 2020 06:00)    ICU Vital Signs Last 24 Hrs  T(C): 37 (07 Aug 2020 10:49), Max: 37.6 (06 Aug 2020 20:50)  T(F): 98.6 (07 Aug 2020 10:49), Max: 99.6 (06 Aug 2020 20:50)  HR: 97 (07 Aug 2020 13:00) (71 - 97)  BP: 106/82 (07 Aug 2020 13:00) (106/82 - 141/82)  BP(mean): 85 (07 Aug 2020 13:00) (70 - 96)  ABP: --  ABP(mean): --  RR: 20 (07 Aug 2020 13:00) (15 - 27)  SpO2: 79% (07 Aug 2020 12:00) (71% - 93%)                            11.6   9.95  )-----------( 331      ( 07 Aug 2020 06:36 )             35.8       CBC Full  -  ( 07 Aug 2020 06:36 )  WBC Count : 9.95 K/uL  RBC Count : 3.94 M/uL  Hemoglobin : 11.6 g/dL  Hematocrit : 35.8 %  Platelet Count - Automated : 331 K/uL  Mean Cell Volume : 90.9 fl  Mean Cell Hemoglobin : 29.4 pg  Mean Cell Hemoglobin Concentration : 32.4 gm/dL  Auto Neutrophil # : 8.59 K/uL  Auto Lymphocyte # : 0.46 K/uL  Auto Monocyte # : 0.68 K/uL  Auto Eosinophil # : 0.15 K/uL  Auto Basophil # : 0.04 K/uL  Auto Neutrophil % : 86.4 %  Auto Lymphocyte % : 4.6 %  Auto Monocyte % : 6.8 %  Auto Eosinophil % : 1.5 %  Auto Basophil % : 0.4 %          144  |  114<H>  |  7   ----------------------------<  92  3.4<L>   |  24  |  0.41<L>    Ca    7.8<L>      07 Aug 2020 06:36    TPro  4.8<L>  /  Alb  2.3<L>  /  TBili  0.3  /  DBili  x   /  AST  170<H>  /  ALT  167<H>  /  AlkPhos  71  08-05      LIVER FUNCTIONS - ( 05 Aug 2020 21:01 )  Alb: 2.3 g/dL / Pro: 4.8 gm/dL / ALK PHOS: 71 U/L / ALT: 167 U/L / AST: 170 U/L / GGT: x                 CARDIAC MARKERS ( 05 Aug 2020 17:58 )  25.400 ng/mL / x     / x     / x     / x                    MEDICATIONS  (STANDING):  aspirin  chewable 81 milliGRAM(s) Chew daily  atorvastatin 40 milliGRAM(s) Oral at bedtime  chlorhexidine 4% Liquid 1 Application(s) Topical <User Schedule>  clopidogrel Tablet 75 milliGRAM(s) Oral daily  heparin   Injectable 5000 Unit(s) SubCutaneous every 12 hours  potassium chloride    Tablet ER 40 milliEquivalent(s) Oral once  sodium chloride 0.45%. 1000 milliLiter(s) (100 mL/Hr) IV Continuous <Continuous>

## 2020-08-07 NOTE — PROGRESS NOTE ADULT - ASSESSMENT
# NSTEMI: s/p thrombectomy, s/p IABP, s/p mid RCA stent x1   - DAPT / STATIN  - IABP removed 8/6   -s/p  Heparin 700 unit/ hr  while on IABP,   per cardio transfer to tele from CCU on 8/7  for 1 more day of monitoring   PT evaluation for discharge planning   - follow up with Dr. Jesus in 4-7 days at time of discharge    # bradycardic / hypotensive secondary to ischemia  ON IVF   avoid AVN blocking agents   - s/p cath   - off pressors  - IABP       # Hypernatremia  improved   continue IVf  overnight and reeval in AM   switched IVF to 1/2 NS    # DVT prophylaxis- heparin SC     dispo- discharge planning once cleared by cardio, PT eval pending, on IVF, check BMP in am , transferree to tele for 24 hrs per cardio # NSTEMI: s/p thrombectomy, s/p IABP, s/p mid RCA stent x1   - DAPT / STATIN  - IABP removed 8/6   -s/p  Heparin 700 unit/ hr  while on IABP,   per cardio transfer to tele from CCU on 8/7  for 1 more day of monitoring   PT evaluation for discharge planning   - follow up with Dr. Jesus in 4-7 days at time of discharge    # bradycardic / hypotensive secondary to ischemia  ON IVF   avoid AVN blocking agents   - s/p cath   - off pressors  - IABP       # Hypernatremia  improved with IVF  Dced IVF  recehck BMP in am   encourage po intake       # DVT prophylaxis- heparin SC     dispo- discharge planning once cleared by cardio, PT eval pending, check BMP in am , transferree to tele for 24 hrs per cardio

## 2020-08-07 NOTE — PROVIDER CONTACT NOTE (CHANGE IN STATUS NOTIFICATION) - ASSESSMENT
pt previously had weak but palpable pedal pulses, now has very weak doppler pulses. Pt denies numbness or tingling to her feet but they are now very cold and appear blotchy.

## 2020-08-07 NOTE — PROGRESS NOTE ADULT - PROBLEM SELECTOR PLAN 1
? borderline ST elevation   s/p RCA stent, initially hypotensive requiring IABP, removed Aug 6th noon,  SBPs WNL over last 12 hrs.  OK to assist w/ ambulation, OK to transfer to tele bed this afternoon. Cont. ASA 81, plavix 75, atorva 40, no BB given bradycardia on admit w/ inferior STEMI,  no ACEI/ARB given hypotension on admit & near normal EF.    with hypotension on IABP support , improving blood pressure , mild hypernatremia , continue antiplatelet drugs  will add statin , lipid profile , EF 50% . ? borderline ST elevation   s/p RCA stent, initially hypotensive requiring IABP, removed Aug 6th noon,  SBPs WNL over last 12 hrs.  OK to assist w/ ambulation, OK to transfer to tele bed this afternoon. Cont. ASA 81, plavix 75, atorva 40, no BB given bradycardia on admit w/ inferior STEMI,  no ACEI/ARB given hypotension on admit & near normal EF.

## 2020-08-07 NOTE — PROGRESS NOTE ADULT - SUBJECTIVE AND OBJECTIVE BOX
HPI:  80 year old female patient presented to the ED after a witnessed syncopal episode.  patient was noted to have intermittent hypotension   episodes of bradycardia , noted to have borderline elevated ST , subsequent cardiac cath   RCA  stent , patient was placed on IABP for 24 hrs removed yesterday at 1200.  Stable over 24 hrs.  Denies any complaints this AM.  Tele w/ short runs NSVT 4-5 beats every 3-4 hrs otherwise NSR.      MEDICATIONS:  OUTPATIENT  Home Medications:  donepezil 10 mg oral tablet: 1 tab(s) orally once a day (at bedtime) (05 Aug 2020 01:24)  pravastatin 40 mg oral tablet: 1 tab(s) orally once a day (05 Aug 2020 01:24)  telmisartan-hydrochlorothiazide 80mg-12.5mg oral tablet: 1 tab(s) orally once a day (05 Aug 2020 01:24)        INPATIENT  MEDICATIONS  (STANDING):  aspirin  chewable 81 milliGRAM(s) Chew daily  atorvastatin 40 milliGRAM(s) Oral at bedtime  chlorhexidine 4% Liquid 1 Application(s) Topical <User Schedule>  clopidogrel Tablet 75 milliGRAM(s) Oral daily  sodium chloride 0.45%. 1000 milliLiter(s) (100 mL/Hr) IV Continuous <Continuous>    Vital Signs Last 24 Hrs  T(C): 37 (07 Aug 2020 10:49), Max: 37.6 (06 Aug 2020 20:50)  T(F): 98.6 (07 Aug 2020 10:49), Max: 99.6 (06 Aug 2020 20:50)  HR: 73 (07 Aug 2020 10:00) (54 - 102)  BP: 133/66 (07 Aug 2020 10:00) (111/56 - 133/66)  BP(mean): 83 (07 Aug 2020 10:00) (70 - 94)  RR: 23 (07 Aug 2020 10:00) (14 - 27)  SpO2: 71% (06 Aug 2020 18:00) (71% - 97%)Daily Height in cm: 160.02 (07 Aug 2020 11:00)    Daily Weight in k.6 (07 Aug 2020 06:00)I&O's Summary    PHYSICAL EXAM:    Constitutional: NAD, awake  HEENT: PERR, EOMI,    Neck:  supple,  No JVD  Respiratory: Breath sounds are clear bilaterally, No wheezing, rales or rhonchi  Cardiovascular: S1 and S2, regular rate and rhythm, no Murmurs, gallops or rubs  Gastrointestinal: Bowel Sounds present, soft, nontender.   Extremities: No peripheral edema. No clubbing or cyanosis.  Vascular: 2+ peripheral pulses  no hematoma at access sites for IABP & cath.  Neurological: A/O x 3, no focal deficits  Musculoskeletal: no calf tenderness.  Skin: No rashes.    LABS: All Labs Reviewed:                        11.6   9.95  )-----------( 331      ( 07 Aug 2020 06:36 )             35.8                         10.8   7.16  )-----------( 258      ( 06 Aug 2020 06:51 )             33.9                         10.2   7.45  )-----------( 269      ( 05 Aug 2020 21:01 )             32.2     07 Aug 2020 06:36    144    |  114    |  7      ----------------------------<  92     3.4     |  24     |  0.41   06 Aug 2020 06:51    146    |  118    |  9      ----------------------------<  85     4.0     |  25     |  0.49   05 Aug 2020 21:01    144    |  116    |  11     ----------------------------<  113    3.7     |  23     |  0.53     Ca    7.8        07 Aug 2020 06:36  Ca    7.8        06 Aug 2020 06:51  Ca    7.5        05 Aug 2020 21:01  Phos  3.1       05 Aug 2020 10:19  Mg     2.0       05 Aug 2020 10:19  Mg     2.7       04 Aug 2020 19:31    TPro  4.8    /  Alb  2.3    /  TBili  0.3    /  DBili  x      /  AST  170    /  ALT  167    /  AlkPhos  71     05 Aug 2020 21:01  TPro  5.1    /  Alb  2.4    /  TBili  0.5    /  DBili  x      /  AST  220    /  ALT  210    /  AlkPhos  76     05 Aug 2020 10:19    PTT - ( 05 Aug 2020 15:42 )  PTT:64.2 sec  CARDIAC MARKERS ( 05 Aug 2020 17:58 )  25.400 ng/mL / x     / x     / x     / x         @ 10:19  TSH: 0.92    ===============================  EKG: NSR, T wave inversions inferiorly.    TELE: see HPI.  ==============================  RADIOLOGY:  < from: Xray Chest 1 View-PORTABLE IMMEDIATE (20 @ 19:49) >  PROCEDURE DATE:  2020    INTERPRETATION:  AP erect chest on 2020 at 7:45 PM. Covid virus test was negative on . Repeat is pending. Patient had a syncopal episode. There is history of dementia. Bradycardia was also detected.    Heart is normal for projection.  The lung fields and pleural surfaces are unremarkable.  There is mild stomach distention with gas.  Chest is similar to September 15, 2010.    IMPRESSION: No acute finding or change.  ==============================  ECHO:  < from: TTE Echo Complete w/o Contrast w/ Doppler (20 @ 09:42) >   Impression     Summary     Fibrocalcific changes noted to the mitral valve leaflets with preserved   leaflet excursion.   Minor mitral annular calcification is present.   Mild (1+) mitral regurgitation is present.   Fibrocalcific changes noted to the Aortic valve leaflets with preserved   leaflet excursion.   Trace aortic regurgitation is present.   ***s/p balloon pump insertion.   The left ventricle is normal in size, wall thickness, wall motion and   contractility   Estimated left ventricular ejection fraction is 55-60 %.  ==============================  CATH:    < from: Cardiac Cath Lab - Adult (20 @ 07:21) >   Cardiac Arteries and Lesion Findings    LMCA: Diffuse irregularity.There is mild diffuse disease noted.    LAD: Diffuse irregularity.There is mild diffuse disease noted.    LCx: Diffuse irregularity.There is mild diffuse disease noted.    RCA: Diffuse irregularity.   Mid RCA: Distal subsection.100% stenosis 26 mm length reduced to 0%.Pre   procedure JOE 0 flow was noted. Good runoff was present.The lesion was   diagnosed as a high risk lesion.The lesion was diffuse.The lesion showed   evidence of thrombus presence, moderate angulation and mild tortuosity.   Post Procedure JOE III flow was present.   Treatment results:Interventional treatment was successful.   Comments:IVUS showed suboptimal stent apposition post deployment. It was   then post dilated with a larger balloon.     Diameter: 3 mm. Length: 20 mm. Total duration: 26 sec.2 inflation(s). to   a max pressure of: 8 CARRIE.   * Synergy DESTINY Stent   Diameter: 3 mm. Length: 32 mm. Total duration: 18 sec.1 inflation(s). to   a max pressure of: 16 CARRIE.    Valves  +------+----------------------------+----------------------------+---------+  !Valve !Stenosis                    !Insufficiency!Comments !  +------+----------------------------+----------------------------+---------+  !Aortic!No                          !Not assessed                !         !  +------+----------------------------+----------------------------+---------+  !Mitral!Not assessed                !No insufficiency            !         !  +------+----------------------------+----------------------------+---------+    LV function assessed as:Abnormal.  Ejection Fraction  +----------------------------------------------------------------------+---+  !Method                                                                !EF%!  +----------------------------------------------------------------------+---+  !LV gram           !50 !  +----------------------------------------------------------------------+---+    VA  Ventriculography Findings:  Low Normal left ventricular systolic function.     Coronary tree   Dominance: Right   Impression     Diagnostic Conclusions   1 vessel CAd with NSTEMI that progressed to RV infarct and cardiogenic   shock requiring Levophed support. IABP placed and LV FX showed EF 50%.     Interventional Conclusions     Successful PCI of RCA   IABP placed prior to intervention for cardiogenic shock     Recommendations     Manage with medical therapy.   IABP weaning off over the next 24 hrs.    Estimated Blood Loss:5ml.  ==============================

## 2020-08-07 NOTE — PROGRESS NOTE ADULT - SUBJECTIVE AND OBJECTIVE BOX
Pt was seen and examined at bedside as RN called to eval. for cold feet.  Pt is resting in bed, calm, comfortable Denies any LE pain , tingling , numbness or any acute c/o at present. US tech at bedside able to find b/l pedal pulse with doppler. Feet are cold to touch but no visible discoloration erythema or tenderness.      b/l venous and arterial doppler results pending.     c/ w current Mx.     Vital Signs Last 24 Hrs  T(C): 37.3 (07 Aug 2020 22:30), Max: 38.2 (07 Aug 2020 21:00)  T(F): 99.1 (07 Aug 2020 22:30), Max: 100.8 (07 Aug 2020 21:00)  HR: 90 (07 Aug 2020 21:00) (71 - 97)  BP: 117/63 (07 Aug 2020 21:00) (106/82 - 141/82)  BP(mean): 75 (07 Aug 2020 21:00) (70 - 96)  RR: 20 (07 Aug 2020 21:00) (15 - 27)  SpO2: 96% (07 Aug 2020 21:00) (79% - 97%)      Hospitalist to f/u in AM.

## 2020-08-08 LAB
ANION GAP SERPL CALC-SCNC: 6 MMOL/L — SIGNIFICANT CHANGE UP (ref 5–17)
BUN SERPL-MCNC: 8 MG/DL — SIGNIFICANT CHANGE UP (ref 7–23)
CALCIUM SERPL-MCNC: 8 MG/DL — LOW (ref 8.5–10.1)
CHLORIDE SERPL-SCNC: 114 MMOL/L — HIGH (ref 96–108)
CO2 SERPL-SCNC: 24 MMOL/L — SIGNIFICANT CHANGE UP (ref 22–31)
CREAT SERPL-MCNC: 0.34 MG/DL — LOW (ref 0.5–1.3)
GLUCOSE SERPL-MCNC: 77 MG/DL — SIGNIFICANT CHANGE UP (ref 70–99)
HCT VFR BLD CALC: 34.4 % — LOW (ref 34.5–45)
HGB BLD-MCNC: 11.1 G/DL — LOW (ref 11.5–15.5)
MCHC RBC-ENTMCNC: 28.9 PG — SIGNIFICANT CHANGE UP (ref 27–34)
MCHC RBC-ENTMCNC: 32.3 GM/DL — SIGNIFICANT CHANGE UP (ref 32–36)
MCV RBC AUTO: 89.6 FL — SIGNIFICANT CHANGE UP (ref 80–100)
PLATELET # BLD AUTO: 314 K/UL — SIGNIFICANT CHANGE UP (ref 150–400)
POTASSIUM SERPL-MCNC: 3.8 MMOL/L — SIGNIFICANT CHANGE UP (ref 3.5–5.3)
POTASSIUM SERPL-SCNC: 3.8 MMOL/L — SIGNIFICANT CHANGE UP (ref 3.5–5.3)
RBC # BLD: 3.84 M/UL — SIGNIFICANT CHANGE UP (ref 3.8–5.2)
RBC # FLD: 14.6 % — HIGH (ref 10.3–14.5)
SODIUM SERPL-SCNC: 144 MMOL/L — SIGNIFICANT CHANGE UP (ref 135–145)
WBC # BLD: 8 K/UL — SIGNIFICANT CHANGE UP (ref 3.8–10.5)
WBC # FLD AUTO: 8 K/UL — SIGNIFICANT CHANGE UP (ref 3.8–10.5)

## 2020-08-08 PROCEDURE — 99233 SBSQ HOSP IP/OBS HIGH 50: CPT

## 2020-08-08 RX ORDER — POTASSIUM CHLORIDE 20 MEQ
40 PACKET (EA) ORAL ONCE
Refills: 0 | Status: COMPLETED | OUTPATIENT
Start: 2020-08-08 | End: 2020-08-08

## 2020-08-08 RX ADMIN — Medication 40 MILLIEQUIVALENT(S): at 17:38

## 2020-08-08 RX ADMIN — Medication 81 MILLIGRAM(S): at 09:43

## 2020-08-08 RX ADMIN — HEPARIN SODIUM 5000 UNIT(S): 5000 INJECTION INTRAVENOUS; SUBCUTANEOUS at 09:43

## 2020-08-08 RX ADMIN — CLOPIDOGREL BISULFATE 75 MILLIGRAM(S): 75 TABLET, FILM COATED ORAL at 09:43

## 2020-08-08 RX ADMIN — Medication 650 MILLIGRAM(S): at 12:12

## 2020-08-08 RX ADMIN — CHLORHEXIDINE GLUCONATE 1 APPLICATION(S): 213 SOLUTION TOPICAL at 07:30

## 2020-08-08 RX ADMIN — HEPARIN SODIUM 5000 UNIT(S): 5000 INJECTION INTRAVENOUS; SUBCUTANEOUS at 22:08

## 2020-08-08 RX ADMIN — Medication 650 MILLIGRAM(S): at 12:51

## 2020-08-08 RX ADMIN — ATORVASTATIN CALCIUM 40 MILLIGRAM(S): 80 TABLET, FILM COATED ORAL at 22:07

## 2020-08-08 NOTE — PROGRESS NOTE ADULT - PROBLEM SELECTOR PLAN 1
? borderline ST elevation   s/p RCA stent, initially hypotensive requiring IABP, removed Aug 6th noon,  SBPs WNL over last 48 hrs.  OK to assist w/ ambulation, OK to transfer to tele bed today. Cont. ASA 81, plavix 75, atorva 40, no BB given bradycardia on admit w/ inferior STEMI,  no ACEI/ARB given hypotension on admit & near normal EF.

## 2020-08-08 NOTE — PROGRESS NOTE ADULT - SUBJECTIVE AND OBJECTIVE BOX
HOSPITALIST PROGRESS NOTE:  SUBJECTIVE:  PCP:  Chief Complaint: Patient is a 80y old  Female who presents with a chief complaint of Symptomatic bradycardia  Syncope (07 Aug 2020 22:43)      HPI:  80 year old female patient presented to the ED after a witnessed syncopal episode. Patient does not recall events surrounding loss of consciousness but states she was notified by her  that she appeared as though she was about to fall but was caught by her  and son before going limp. Patient with months long history of chest pain but denies any palpitations, dizziness, sob, peripheral edema, abdominal pain, nausea or vomiting.  In the ED patient with sinus bradycardia on telemetry ranging from mid 30s to 50s with occasional PVC. Patient was started on levophed by ED provider. (05 Aug 2020 01:08)  BP imroved , has IABP removed 8/7   HRs in the 56. asymptomatic. No arrhtyhmias on tele.    8/8: ABove reviewed; patient has no complaints; feling better; Overnight her feet was cold and unable to get pulses; venous and arterial doppler was negative         Allergies:  No Known Allergies    REVIEW OF SYSTEMS:  See HPI. All other review of systems is negative unless indicated above.     OBJECTIVE  Physical Exam:  Vital Signs:    Vital Signs Last 24 Hrs  T(C): 36.5 (08 Aug 2020 10:20), Max: 38.2 (07 Aug 2020 21:00)  T(F): 97.7 (08 Aug 2020 10:20), Max: 100.8 (07 Aug 2020 21:00)  HR: 80 (08 Aug 2020 16:00) (66 - 97)  BP: 106/57 (08 Aug 2020 16:00) (91/53 - 140/77)  BP(mean): 69 (08 Aug 2020 16:00) (61 - 92)  RR: 18 (08 Aug 2020 16:00) (15 - 26)  SpO2: 99% (08 Aug 2020 16:00) (94% - 100%)  I&O's Summary    07 Aug 2020 07:01  -  08 Aug 2020 07:00  --------------------------------------------------------  IN: 0 mL / OUT: 800 mL / NET: -800 mL        Constitutional: NAD, awake and alert, well-developed  Neurological: AAO x 3, no focal deficits  HEENT: PERRLA, EOMI, MMM  Neck: Soft and supple, No LAD, No JVD  Respiratory: Breath sounds are clear bilaterally, No wheezing, rales or rhonchi  Cardiovascular: S1 and S2, regular rate and rhythm; no Murmurs, gallops or rubs  Gastrointestinal: Bowel Sounds present, soft, nontender, nondistended, no guarding, no rebound tenderness  Back: No CVA tenderness   Extremities: No peripheral edema  Vascular: very weak pulse and left feet feels cold; no pain  Musculoskeletal: 5/5 strength b/l upper and lower extremities  Skin: No rashes  Breast: Deferred  Rectal: Deferred    MEDICATIONS  (STANDING):  aspirin  chewable 81 milliGRAM(s) Chew daily  atorvastatin 40 milliGRAM(s) Oral at bedtime  chlorhexidine 4% Liquid 1 Application(s) Topical <User Schedule>  clopidogrel Tablet 75 milliGRAM(s) Oral daily  heparin   Injectable 5000 Unit(s) SubCutaneous every 12 hours      LABS: All Labs Reviewed:                        11.1   8.00  )-----------( 314      ( 08 Aug 2020 06:34 )             34.4     08-08    144  |  114<H>  |  8   ----------------------------<  77  3.8   |  24  |  0.34<L>    Ca    8.0<L>      08 Aug 2020 06:34      RADIOLOGY/EKG:    < from: Xray Chest 1 View-PORTABLE IMMEDIATE (08.04.20 @ 19:49) >    INTERPRETATION:  AP erect chest on August 4, 2020 at 7:45 PM. Covid virus test was negative on July 11. Repeat is pending. Patient had a syncopal episode. There is history of dementia. Bradycardia was also detected.    Heart is normal for projection.    The lung fields and pleural surfaces are unremarkable.    There is mild stomach distention with gas.    Chest is similar to September 15, 2010.    IMPRESSION: No acute finding or change.    < end of copied text >    < from: CT Head No Cont (08.04.20 @ 19:46) >    IMPRESSION:    No acute intracranial hemorrhage, mass effect, or evidence of acute vascular territorial infarction.    If clinical symptoms persist or worsen, more sensitive evaluation with brain MRI may be obtained, if no contraindications exist.    < end of copied text >    < from: US Duplex Arterial Lower Ext Compl, Bilateral (08.07.20 @ 22:25) >    IMPRESSION:    No significant stenosis or occlusion in either leg.    < end of copied text >    < from: US Duplex Venous Lower Ext Complete, Bilateral (08.07.20 @ 22:21) >    IMPRESSION:  No evidence of deep venous thrombosis in either lower extremity.    < end of copied text >

## 2020-08-08 NOTE — PROGRESS NOTE ADULT - ASSESSMENT
# NSTEMI: s/p thrombectomy, s/p IABP, s/p mid RCA stent x1   - DAPT / STATIN  - IABP removed 8/6   - s/p  Heparin 700 unit/ hr  while on IABP,   - cardio consult appreciated   - PT evaluation for discharge planning   - follow up with Dr. Jseus in 4-7 days at time of discharge  - Cont. ASA 81, plavix 75, atorva 40, no BB given bradycardia    # bradycardic / hypotensive secondary to ischemia  ON IVF   avoid AVN blocking agents   - s/p cath   - off pressors and S/P IABP    #Decrease Pulses in B/L LE R/O Pseudoaneurysm ?PVD  -Arterial and Venous Doppler Neg  -Vascular consult      # Hypernatremia  improved with IVF  Dced IVF  recehck BMP in am   encourage po intake     # DVT prophylaxis- heparin SC     dispo- PT eval pending, check BMP in am , and FU with vascular

## 2020-08-08 NOTE — PROVIDER CONTACT NOTE (OTHER) - SITUATION
Notified Dr. Cabral office regarding consult spoke with Vivi from answering service.
Called EP and they are aware of consult

## 2020-08-08 NOTE — PROGRESS NOTE ADULT - SUBJECTIVE AND OBJECTIVE BOX
80 year old female patient presented to the ED after a witnessed syncopal episode.  patient was noted to have intermittent hypotension   episodes of bradycardia , noted to have borderline elevated ST , subsequent cardiac cath   RCA  stent , patient was placed on IABP for 24 hrs removed yesterday at 1200.  Stable over 48 hrs.    Overnight RN callled for evaluation of cold feet.  No pain, tingling, numbness.    US tech at bedside able to get pedal pulse w/ doppler.  Vascular surgery called for further evaluation.    INPATIENT:  MEDICATIONS  (STANDING):  aspirin  chewable 81 milliGRAM(s) Chew daily  atorvastatin 40 milliGRAM(s) Oral at bedtime  chlorhexidine 4% Liquid 1 Application(s) Topical <User Schedule>  clopidogrel Tablet 75 milliGRAM(s) Oral daily  heparin   Injectable 5000 Unit(s) SubCutaneous every 12 hours    MEDICATIONS  (STANDING):  aspirin  chewable 81 milliGRAM(s) Chew daily  atorvastatin 40 milliGRAM(s) Oral at bedtime  chlorhexidine 4% Liquid 1 Application(s) Topical <User Schedule>  clopidogrel Tablet 75 milliGRAM(s) Oral daily  heparin   Injectable 5000 Unit(s) SubCutaneous every 12 hours        Vital Signs Last 24 Hrs  T(C): 36.5 (08 Aug 2020 10:20), Max: 38.2 (07 Aug 2020 21:00)  T(F): 97.7 (08 Aug 2020 10:20), Max: 100.8 (07 Aug 2020 21:00)  HR: 87 (08 Aug 2020 18:00) (66 - 94)  BP: 137/72 (08 Aug 2020 18:00) (91/53 - 140/77)  BP(mean): 89 (08 Aug 2020 18:00) (61 - 92)  RR: 17 (08 Aug 2020 18:00) (15 - 26)  SpO2: 94% (08 Aug 2020 18:00) (94% - 100%)        PHYSICAL EXAM:    Constitutional: NAD, awake  HEENT: PERR, EOMI,    Neck:  supple,  No JVD  Respiratory: Breath sounds are clear bilaterally, No wheezing, rales or rhonchi  Cardiovascular: S1 and S2, regular rate and rhythm, no Murmurs, gallops or rubs  Gastrointestinal: Bowel Sounds present, soft, nontender.   Extremities: No peripheral edema. No clubbing or cyanosis.  Vascular: 1+ peripheral pulses  no hematoma at access sites for IABP & cath.  Neurological: A/O x 3, no focal deficits  Musculoskeletal: no calf tenderness.  Skin: No rashes.        LABS: All Labs Reviewed:                        11.1   8.00  )-----------( 314      ( 08 Aug 2020 06:34 )             34.4                         11.6   9.95  )-----------( 331      ( 07 Aug 2020 06:36 )             35.8                         10.8   7.16  )-----------( 258      ( 06 Aug 2020 06:51 )             33.9     08 Aug 2020 06:34    144    |  114    |  8      ----------------------------<  77     3.8     |  24     |  0.34   07 Aug 2020 06:36    144    |  114    |  7      ----------------------------<  92     3.4     |  24     |  0.41   06 Aug 2020 06:51    146    |  118    |  9      ----------------------------<  85     4.0     |  25     |  0.49     Ca    8.0        08 Aug 2020 06:34  Ca    7.8        07 Aug 2020 06:36  Ca    7.8        06 Aug 2020 06:51    TPro  4.8    /  Alb  2.3    /  TBili  0.3    /  DBili  x      /  AST  170    /  ALT  167    /  AlkPhos  71     05 Aug 2020 21:01        ===============================  EKG: NSR, T wave inversions inferiorly.    TELE: see HPI.  ==============================  RADIOLOGY:  < from: Xray Chest 1 View-PORTABLE IMMEDIATE (08.04.20 @ 19:49) >  PROCEDURE DATE:  08/04/2020    INTERPRETATION:  AP erect chest on August 4, 2020 at 7:45 PM. Covid virus test was negative on July 11. Repeat is pending. Patient had a syncopal episode. There is history of dementia. Bradycardia was also detected.    Heart is normal for projection.  The lung fields and pleural surfaces are unremarkable.  There is mild stomach distention with gas.  Chest is similar to September 15, 2010.    IMPRESSION: No acute finding or change.  ==============================  ECHO:  < from: TTE Echo Complete w/o Contrast w/ Doppler (08.05.20 @ 09:42) >   Impression     Summary     Fibrocalcific changes noted to the mitral valve leaflets with preserved   leaflet excursion.   Minor mitral annular calcification is present.   Mild (1+) mitral regurgitation is present.   Fibrocalcific changes noted to the Aortic valve leaflets with preserved   leaflet excursion.   Trace aortic regurgitation is present.   ***s/p balloon pump insertion.   The left ventricle is normal in size, wall thickness, wall motion and   contractility   Estimated left ventricular ejection fraction is 55-60 %.  ==============================  CATH:    < from: Cardiac Cath Lab - Adult (08.05.20 @ 07:21) >   Cardiac Arteries and Lesion Findings    LMCA: Diffuse irregularity.There is mild diffuse disease noted.    LAD: Diffuse irregularity.There is mild diffuse disease noted.    LCx: Diffuse irregularity.There is mild diffuse disease noted.    RCA: Diffuse irregularity.   Mid RCA: Distal subsection.100% stenosis 26 mm length reduced to 0%.Pre   procedure JOE 0 flow was noted. Good runoff was present.The lesion was   diagnosed as a high risk lesion.The lesion was diffuse.The lesion showed   evidence of thrombus presence, moderate angulation and mild tortuosity.   Post Procedure JOE III flow was present.   Treatment results:Interventional treatment was successful.   Comments:IVUS showed suboptimal stent apposition post deployment. It was   then post dilated with a larger balloon.     Diameter: 3 mm. Length: 20 mm. Total duration: 26 sec.2 inflation(s). to   a max pressure of: 8 CARRIE.   * Synergy DESTINY Stent   Diameter: 3 mm. Length: 32 mm. Total duration: 18 sec.1 inflation(s). to   a max pressure of: 16 CARRIE.    Valves  +------+----------------------------+----------------------------+---------+  !Valve !Stenosis                    !Insufficiency!Comments !  +------+----------------------------+----------------------------+---------+  !Aortic!No                          !Not assessed                !         !  +------+----------------------------+----------------------------+---------+  !Mitral!Not assessed                !No insufficiency            !         !  +------+----------------------------+----------------------------+---------+    LV function assessed as:Abnormal.  Ejection Fraction  +----------------------------------------------------------------------+---+  !Method                                                                !EF%!  +----------------------------------------------------------------------+---+  !LV gram           !50 !  +----------------------------------------------------------------------+---+    VA  Ventriculography Findings:  Low Normal left ventricular systolic function.     Coronary tree   Dominance: Right   Impression     Diagnostic Conclusions   1 vessel CAd with NSTEMI that progressed to RV infarct and cardiogenic   shock requiring Levophed support. IABP placed and LV FX showed EF 50%.     Interventional Conclusions     Successful PCI of RCA   IABP placed prior to intervention for cardiogenic shock     Recommendations     Manage with medical therapy.   IABP weaning off over the next 24 hrs.    Estimated Blood Loss:5ml.  ==============================

## 2020-08-09 LAB
ANION GAP SERPL CALC-SCNC: 6 MMOL/L — SIGNIFICANT CHANGE UP (ref 5–17)
BUN SERPL-MCNC: 9 MG/DL — SIGNIFICANT CHANGE UP (ref 7–23)
CALCIUM SERPL-MCNC: 8.1 MG/DL — LOW (ref 8.5–10.1)
CHLORIDE SERPL-SCNC: 112 MMOL/L — HIGH (ref 96–108)
CO2 SERPL-SCNC: 24 MMOL/L — SIGNIFICANT CHANGE UP (ref 22–31)
CREAT SERPL-MCNC: 0.46 MG/DL — LOW (ref 0.5–1.3)
GLUCOSE SERPL-MCNC: 80 MG/DL — SIGNIFICANT CHANGE UP (ref 70–99)
HCT VFR BLD CALC: 32.6 % — LOW (ref 34.5–45)
HGB BLD-MCNC: 10.5 G/DL — LOW (ref 11.5–15.5)
MAGNESIUM SERPL-MCNC: 1.8 MG/DL — SIGNIFICANT CHANGE UP (ref 1.6–2.6)
MCHC RBC-ENTMCNC: 28.7 PG — SIGNIFICANT CHANGE UP (ref 27–34)
MCHC RBC-ENTMCNC: 32.2 GM/DL — SIGNIFICANT CHANGE UP (ref 32–36)
MCV RBC AUTO: 89.1 FL — SIGNIFICANT CHANGE UP (ref 80–100)
PHOSPHATE SERPL-MCNC: 3 MG/DL — SIGNIFICANT CHANGE UP (ref 2.5–4.5)
PLATELET # BLD AUTO: 353 K/UL — SIGNIFICANT CHANGE UP (ref 150–400)
POTASSIUM SERPL-MCNC: 3.7 MMOL/L — SIGNIFICANT CHANGE UP (ref 3.5–5.3)
POTASSIUM SERPL-SCNC: 3.7 MMOL/L — SIGNIFICANT CHANGE UP (ref 3.5–5.3)
RBC # BLD: 3.66 M/UL — LOW (ref 3.8–5.2)
RBC # FLD: 14.6 % — HIGH (ref 10.3–14.5)
SODIUM SERPL-SCNC: 142 MMOL/L — SIGNIFICANT CHANGE UP (ref 135–145)
WBC # BLD: 7.62 K/UL — SIGNIFICANT CHANGE UP (ref 3.8–10.5)
WBC # FLD AUTO: 7.62 K/UL — SIGNIFICANT CHANGE UP (ref 3.8–10.5)

## 2020-08-09 PROCEDURE — 99232 SBSQ HOSP IP/OBS MODERATE 35: CPT

## 2020-08-09 PROCEDURE — 99233 SBSQ HOSP IP/OBS HIGH 50: CPT

## 2020-08-09 RX ADMIN — ATORVASTATIN CALCIUM 40 MILLIGRAM(S): 80 TABLET, FILM COATED ORAL at 22:46

## 2020-08-09 RX ADMIN — HEPARIN SODIUM 5000 UNIT(S): 5000 INJECTION INTRAVENOUS; SUBCUTANEOUS at 22:46

## 2020-08-09 RX ADMIN — Medication 81 MILLIGRAM(S): at 09:54

## 2020-08-09 RX ADMIN — CLOPIDOGREL BISULFATE 75 MILLIGRAM(S): 75 TABLET, FILM COATED ORAL at 09:54

## 2020-08-09 RX ADMIN — CHLORHEXIDINE GLUCONATE 1 APPLICATION(S): 213 SOLUTION TOPICAL at 09:05

## 2020-08-09 RX ADMIN — HEPARIN SODIUM 5000 UNIT(S): 5000 INJECTION INTRAVENOUS; SUBCUTANEOUS at 09:54

## 2020-08-09 NOTE — PROGRESS NOTE ADULT - SUBJECTIVE AND OBJECTIVE BOX
80 year old female patient presented to the ED after a witnessed syncopal episode.  patient was noted to have intermittent hypotension   episodes of bradycardia , noted to have borderline elevated ST , subsequent cardiac cath   RCA  stent , patient was placed on IABP for 24 hrs removed 8/7.  Stable over 72 hrs.  Vascular evaluation today for diminished pedal pulses w/ normal vascualr studies  found no evidence of ischemia by clinical assessment or on exam.  no vascular intervention or diagnostic required.  Pt denies any symptoms overnight: no chest pain, dyspnea etc.  No ambulating w/ assistance yet.    MEDICATIONS:    INPATIENT  MEDICATIONS  (STANDING):  aspirin  chewable 81 milliGRAM(s) Chew daily  atorvastatin 40 milliGRAM(s) Oral at bedtime  chlorhexidine 4% Liquid 1 Application(s) Topical <User Schedule>  clopidogrel Tablet 75 milliGRAM(s) Oral daily  heparin   Injectable 5000 Unit(s) SubCutaneous every 12 hours      Vital Signs Last 24 Hrs  T(C): 36.9 (09 Aug 2020 17:17), Max: 37.1 (09 Aug 2020 09:15)  T(F): 98.4 (09 Aug 2020 17:17), Max: 98.8 (09 Aug 2020 09:15)  HR: 83 (09 Aug 2020 17:17) (83 - 95)  BP: 108/59 (09 Aug 2020 17:17) (97/72 - 139/63)  BP(mean): 82 (08 Aug 2020 20:14) (77 - 82)  RR: 18 (09 Aug 2020 17:17) (18 - 25)  SpO2: 93% (09 Aug 2020 17:17) (92% - 94%)Daily     Daily I&O's Summary      PHYSICAL EXAM:    Constitutional: NAD, awake  HEENT: PERR, EOMI,    Neck:  supple,  No JVD  Respiratory: Breath sounds are clear bilaterally, No wheezing, rales or rhonchi  Cardiovascular: S1 and S2, regular rate and rhythm, no Murmurs, gallops or rubs  Gastrointestinal: Bowel Sounds present, soft, nontender.   Extremities: No peripheral edema. No clubbing or cyanosis.  Vascular: 1+ peripheral pulses  no hematoma at access sites for IABP & cath.  Neurological: A/O x 3, no focal deficits  Musculoskeletal: no calf tenderness.  Skin: No rashes.      LABS: All Labs Reviewed:                        10.5   7.62  )-----------( 353      ( 09 Aug 2020 07:29 )             32.6                         11.1   8.00  )-----------( 314      ( 08 Aug 2020 06:34 )             34.4                         11.6   9.95  )-----------( 331      ( 07 Aug 2020 06:36 )             35.8     09 Aug 2020 07:29    142    |  112    |  9      ----------------------------<  80     3.7     |  24     |  0.46   08 Aug 2020 06:34    144    |  114    |  8      ----------------------------<  77     3.8     |  24     |  0.34   07 Aug 2020 06:36    144    |  114    |  7      ----------------------------<  92     3.4     |  24     |  0.41     Ca    8.1        09 Aug 2020 07:29  Ca    8.0        08 Aug 2020 06:34  Ca    7.8        07 Aug 2020 06:36  Phos  3.0       09 Aug 2020 07:29  Mg     1.8       09 Aug 2020 07:29        PHYSICAL EXAM:    Constitutional: NAD, awake  HEENT: PERR, EOMI,    Neck:  supple,  No JVD  Respiratory: Breath sounds are clear bilaterally, No wheezing, rales or rhonchi  Cardiovascular: S1 and S2, regular rate and rhythm, no Murmurs, gallops or rubs  Gastrointestinal: Bowel Sounds present, soft, nontender.   Extremities: No peripheral edema. No clubbing or cyanosis.  Vascular: 1+ peripheral pulses  no hematoma at access sites for IABP & cath.  Neurological: A/O x 3, no focal deficits  Musculoskeletal: no calf tenderness.  Skin: No rashes.      ===============================  EKG: NSR, T wave inversions inferiorly.    TELE: no arrhythmias.  ==============================  RADIOLOGY:  < from: Xray Chest 1 View-PORTABLE IMMEDIATE (08.04.20 @ 19:49) >  PROCEDURE DATE:  08/04/2020    INTERPRETATION:  AP erect chest on August 4, 2020 at 7:45 PM. Covid virus test was negative on July 11. Repeat is pending. Patient had a syncopal episode. There is history of dementia. Bradycardia was also detected.    Heart is normal for projection.  The lung fields and pleural surfaces are unremarkable.  There is mild stomach distention with gas.  Chest is similar to September 15, 2010.    IMPRESSION: No acute finding or change.  ==============================  ECHO:  < from: TTE Echo Complete w/o Contrast w/ Doppler (08.05.20 @ 09:42) >   Impression     Summary     Fibrocalcific changes noted to the mitral valve leaflets with preserved   leaflet excursion.   Minor mitral annular calcification is present.   Mild (1+) mitral regurgitation is present.   Fibrocalcific changes noted to the Aortic valve leaflets with preserved   leaflet excursion.   Trace aortic regurgitation is present.   ***s/p balloon pump insertion.   The left ventricle is normal in size, wall thickness, wall motion and   contractility   Estimated left ventricular ejection fraction is 55-60 %.  ==============================  CATH:    < from: Cardiac Cath Lab - Adult (08.05.20 @ 07:21) >   Cardiac Arteries and Lesion Findings    LMCA: Diffuse irregularity.There is mild diffuse disease noted.    LAD: Diffuse irregularity.There is mild diffuse disease noted.    LCx: Diffuse irregularity.There is mild diffuse disease noted.    RCA: Diffuse irregularity.   Mid RCA: Distal subsection.100% stenosis 26 mm length reduced to 0%.Pre   procedure JOE 0 flow was noted. Good runoff was present.The lesion was   diagnosed as a high risk lesion.The lesion was diffuse.The lesion showed   evidence of thrombus presence, moderate angulation and mild tortuosity.   Post Procedure JOE III flow was present.   Treatment results:Interventional treatment was successful.   Comments:IVUS showed suboptimal stent apposition post deployment. It was   then post dilated with a larger balloon.     Diameter: 3 mm. Length: 20 mm. Total duration: 26 sec.2 inflation(s). to   a max pressure of: 8 CARRIE.   * Synergy DESTINY Stent   Diameter: 3 mm. Length: 32 mm. Total duration: 18 sec.1 inflation(s). to   a max pressure of: 16 CARRIE.    Valves  +------+----------------------------+----------------------------+---------+  !Valve !Stenosis                    !Insufficiency!Comments !  +------+----------------------------+----------------------------+---------+  !Aortic!No                          !Not assessed                !         !  +------+----------------------------+----------------------------+---------+  !Mitral!Not assessed                !No insufficiency            !         !  +------+----------------------------+----------------------------+---------+    LV function assessed as:Abnormal.  Ejection Fraction  +----------------------------------------------------------------------+---+  !Method                                                                !EF%!  +----------------------------------------------------------------------+---+  !LV gram           !50 !  +----------------------------------------------------------------------+---+    VA  Ventriculography Findings:  Low Normal left ventricular systolic function.     Coronary tree   Dominance: Right   Impression     Diagnostic Conclusions   1 vessel CAd with NSTEMI that progressed to RV infarct and cardiogenic   shock requiring Levophed support. IABP placed and LV FX showed EF 50%.     Interventional Conclusions     Successful PCI of RCA   IABP placed prior to intervention for cardiogenic shock     Recommendations     Manage with medical therapy.   IABP weaning off over the next 24 hrs.    Estimated Blood Loss:5ml.  ==============================

## 2020-08-09 NOTE — PROGRESS NOTE ADULT - PROBLEM SELECTOR PLAN 1
? borderline ST elevation  s/p RCA stent, initially hypotensive requiring IABP, removed Aug 7th noon,  SBPs WNL over last 48 hrs.  Please start ambulation w/ assistance, Cont. ASA 81, plavix 75, atorva 40, no BB given bradycardia on admit w/ inferior STEMI, will consider starting as OP. No ACEI/ARB as BP low normal & EF near normal.

## 2020-08-09 NOTE — PROGRESS NOTE ADULT - SUBJECTIVE AND OBJECTIVE BOX
HOSPITALIST PROGRESS NOTE:  SUBJECTIVE:  PCP:  Chief Complaint: Patient is a 80y old  Female who presents with a chief complaint of Symptomatic bradycardia  Syncope (07 Aug 2020 22:43)      HPI:  80 year old female patient presented to the ED after a witnessed syncopal episode. Patient does not recall events surrounding loss of consciousness but states she was notified by her  that she appeared as though she was about to fall but was caught by her  and son before going limp. Patient with months long history of chest pain but denies any palpitations, dizziness, sob, peripheral edema, abdominal pain, nausea or vomiting.  In the ED patient with sinus bradycardia on telemetry ranging from mid 30s to 50s with occasional PVC. Patient was started on levophed by ED provider. (05 Aug 2020 01:08)  BP imroved , has IABP removed 8/7   HRs in the 56. asymptomatic. No arrhtyhmias on tele.    8/8: ABove reviewed; patient has no complaints; feeling better; Overnight her feet was cold and unable to get pulses; venous and arterial doppler was negative   8/9:  Patient has no complaints.  Seen by vascular and cleared; Awaiting PT evaluation Discussed with son    Allergies:  No Known Allergies    REVIEW OF SYSTEMS:  See HPI. All other review of systems is negative unless indicated above.     OBJECTIVE  Physical Exam:  Vital Signs Last 24 Hrs  T(C): 37.1 (09 Aug 2020 09:15), Max: 37.1 (09 Aug 2020 09:15)  T(F): 98.8 (09 Aug 2020 09:15), Max: 98.8 (09 Aug 2020 09:15)  HR: 84 (09 Aug 2020 09:15) (79 - 98)  BP: 112/66 (09 Aug 2020 09:15) (97/72 - 139/63)  BP(mean): 82 (08 Aug 2020 20:14) (69 - 89)  RR: 21 (09 Aug 2020 09:15) (17 - 26)  SpO2: 92% (09 Aug 2020 09:15) (92% - 100%)    Constitutional: NAD, awake and alert, well-developed  Neurological: AAO x 3, no focal deficits  HEENT: PERRLA, EOMI, MMM  Neck: Soft and supple, No LAD, No JVD  Respiratory: Breath sounds are clear bilaterally, No wheezing, rales or rhonchi  Cardiovascular: S1 and S2, regular rate and rhythm; no Murmurs, gallops or rubs  Gastrointestinal: Bowel Sounds present, soft, nontender, nondistended, no guarding, no rebound tenderness  Back: No CVA tenderness   Extremities: No peripheral edema  Vascular: very weak pulse and left feet feels cold; no pain  Musculoskeletal: 5/5 strength b/l upper and lower extremities  Skin: No rashes  Breast: Deferred  Rectal: Deferred    MEDICATIONS  (STANDING):  aspirin  chewable 81 milliGRAM(s) Chew daily  atorvastatin 40 milliGRAM(s) Oral at bedtime  chlorhexidine 4% Liquid 1 Application(s) Topical <User Schedule>  clopidogrel Tablet 75 milliGRAM(s) Oral daily  heparin   Injectable 5000 Unit(s) SubCutaneous every 12 hours      LABS: All Labs Reviewed:                        11.1   8.00  )-----------( 314      ( 08 Aug 2020 06:34 )             34.4     08-08    144  |  114<H>  |  8   ----------------------------<  77  3.8   |  24  |  0.34<L>    Ca    8.0<L>      08 Aug 2020 06:34      RADIOLOGY/EKG:    < from: Xray Chest 1 View-PORTABLE IMMEDIATE (08.04.20 @ 19:49) >    INTERPRETATION:  AP erect chest on August 4, 2020 at 7:45 PM. Covid virus test was negative on July 11. Repeat is pending. Patient had a syncopal episode. There is history of dementia. Bradycardia was also detected.    Heart is normal for projection.    The lung fields and pleural surfaces are unremarkable.    There is mild stomach distention with gas.    Chest is similar to September 15, 2010.    IMPRESSION: No acute finding or change.    < end of copied text >    < from: CT Head No Cont (08.04.20 @ 19:46) >    IMPRESSION:    No acute intracranial hemorrhage, mass effect, or evidence of acute vascular territorial infarction.    If clinical symptoms persist or worsen, more sensitive evaluation with brain MRI may be obtained, if no contraindications exist.    < end of copied text >    < from: US Duplex Arterial Lower Ext Compl, Bilateral (08.07.20 @ 22:25) >    IMPRESSION:    No significant stenosis or occlusion in either leg.    < end of copied text >    < from: US Duplex Venous Lower Ext Complete, Bilateral (08.07.20 @ 22:21) >    IMPRESSION:  No evidence of deep venous thrombosis in either lower extremity.    < end of copied text >

## 2020-08-09 NOTE — PROGRESS NOTE ADULT - ASSESSMENT
# NSTEMI: s/p thrombectomy, s/p IABP, s/p mid RCA stent x1   - DAPT / STATIN  - IABP removed 8/6   - s/p  Heparin 700 unit/ hr  while on IABP,   - cardio consult appreciated   - PT evaluation for discharge planning   - follow up with Dr. Jesus in 4-7 days at time of discharge  - Cont. ASA 81, plavix 75, atorva 40, no BB given bradycardia    # bradycardic / hypotensive secondary to ischemia  ON IVF   avoid AVN blocking agents   - s/p cath   - off pressors and S/P IABP    #Decrease Pulses in B/L LE R/O Pseudoaneurysm ?PVD  -Arterial and Venous Doppler Neg  -Vascular consult appreciated no evidence of ischemia either by clinical assessment or on exam.  No vascular intervention required nor further diagnostics.      # Hypernatremia  improved with IVF  Dced IVF  recehck BMP in am  encourage po intake     # DVT prophylaxis- heparin SC     dispo- Possible D/C [facility]; PT eval pending, POssible Home with Home care versus cardiac rehab; FU with [facility]

## 2020-08-09 NOTE — CONSULT NOTE ADULT - SUBJECTIVE AND OBJECTIVE BOX
History and Physical:   Source of Information	Patient	  Outpatient Providers	PCP: Armani	    Language:  · Patient/Family of Limited English Proficiency	No	    Patient Identity:  · Birth Sex	Female	  · Sexual Orientation	Straight, Heterosexual	    Telehealth:   Telehealth:  · Telehealth?	No	      History of Present Illness:  Reason for Admission: Symptomatic bradycardia Syncope	  History of Present Illness: 	  80 year old female patient presented to the ED after a witnessed syncopal episode. Patient does not recall events surrounding loss of consciousness but states she was notified by her  that she appeared as though she was about to fall but was caught by her  and son before going limp. Patient with months long history of chest pain but denies any palpitations, dizziness, sob, peripheral edema, abdominal pain, nausea or vomiting.      In the ED patient with sinus bradycardia on telemetry ranging from mid 30s to 50s with occasional PVC. Patient was started on levophed by ED provider.    Called to see patient for difficult to palpate distal pulses bilaterally and cold feet.  She currently denies foot pain, neurosensory changes or paresthesias or motor dysfunction.  Apparently, she underwent a catheterization and had placement of an IABP recently.  Arterial duplex performed Aug 7 essentially normal proximal (to ankle) arterial flow.      Review of Systems:  · Negative General Symptoms	no fever; no chills	  · General Symptoms	fatigue; weakness	  · Ophthalmologic	negative	  · Negative Respiratory and Thorax Symptoms	no wheezing; no dyspnea; no cough	  · Negative Cardiovascular Symptoms	no palpitations; no dyspnea on exertion	  · Cardiovascular Symptoms	chest pain	  · Gastrointestinal	negative	  · Musculoskeletal	negative	  · Neurological	negative	      Allergies and Intolerances:        Allergies:  	No Known Allergies:     Home Medications:   * Outpatient Medication Status not yet specified    .    Patient History:   Past Medical, Past Surgical, and Family History:  PAST MEDICAL HISTORY:  Deformity of toe of right foot     No pertinent past medical history.    Social History:  Social History (marital status, living situation, occupation, tobacco use, alcohol and drug use, and sexual history): . Lives with . Denies smoking, alcohol or drug use	    Tobacco Screening:  · Core Measure Site	Yes	  · Has the patient used tobacco in the past 30 days?	No	    Risk Assessment:   Present on Admission:  Deep Venous Thrombosis	no	  Pulmonary Embolus	no	  Urinary Catheter	no	  Central Venous Catheter/PICC Line	no	  Surgical Site Incision	no	  Pressure Ulcer(s)	no	    Heart Failure:  Does this patient have a history of or has been diagnosed with heart failure? unknown.    Physical Exam:   Physical Exam: Vital Signs Last 24 Hrs  T(C): 36.6 (04 Aug 2020 19:07), Max: 36.6 (04 Aug 2020 19:07)  T(F): 97.8 (04 Aug 2020 19:07), Max: 97.8 (04 Aug 2020 19:07)  HR: 59 (05 Aug 2020 00:00) (39 - 70)  BP: 105/49 (05 Aug 2020 00:00) (78/49 - 105/49)  BP(mean): 74 (04 Aug 2020 23:44) (61 - 79)  RR: 16 (05 Aug 2020 00:00) (16 - 18) SpO2: 100% (05 Aug 2020 00:00) (94% - 100%)	    Physical Exam:  · Constitutional	Well-developed, well nourished	  · Eyes	EOMI; PERRL; no drainage or redness	  · ENMT	No oral lesions; no gross abnormalities	  · Neck	No bruits; no thyromegaly or nodules	  · Back	No deformity or limitation of movement	  · Respiratory	Breath Sounds equal & clear to percussion & auscultation, no accessory muscle use	  · Cardiovascular	Regular rate & rhythm, normal S1, S2; no murmurs, gallops or rubs; no S3, S4	  · Gastrointestinal	Soft, non-tender, no hepatosplenomegaly, normal bowel sounds	  · Extremities	No cyanosis, clubbing or edema	  · Vascular	pink, warm and well perfused feet bilaterally with intact and normal neurosensory and motor function; 1+ distal AT pulses bilaterally	  · Neurological	Alert & oriented; no sensory, motor or coordination deficits, normal reflexes	  · Skin	No lesions; no rash	  · Lymph Nodes	No lymphadedenopathy	  · Musculoskeletal	No joint pain, swelling or deformity; no limitation of movement	  · Psychiatric	Affect and characteristics of appearance, verbalizations, behaviors are appropriate	      Labs and Results:  Labs, Radiology, Cardiology, and Other Results: EKG: Sinus bradycardia at 47. No blocks or ST changes noted	    Laboratory:   Admit:	    05-Aug-2020 00:08, Bed Request	  Bed Status: UNASSIGNED	  Patient Bed Information: UNASSIGNED	  General Chemistry:	    04-Aug-2020 19:31, Basic Metabolic Panel	  Sodium, Serum: 143, [135 - 145 mmol/L]	  Potassium, Serum: 4.1, [3.5 - 5.3 mmol/L]	  Chloride, Serum: 106, [96 - 108 mmol/L]	  Carbon Dioxide, Serum: 31, [22 - 31 mmol/L]	  Anion Gap, Serum: 6, [5 - 17 mmol/L]	  Blood Urea Nitrogen, Serum: 18, [7 - 23 mg/dL]	  Creatinine, Serum: 0.73, [0.50 - 1.30 mg/dL]	  Glucose, Serum:    141, [70 - 99 mg/dL]	  Calcium, Total Serum: 9.6, [8.5 - 10.1 mg/dL]	  eGFR if Non African American: 78, [>=60 mL/min/1.73M2], Interpretative comment  The units for eGFR are mL/min/1.73M2 (normalized body surface area). The  eGFR is calculated from a serum creatinine using the CKD-EPI equation.  Other variables required for calculation are race, age and sex. Among  patients with chronic kidney disease (CKD), the eGFR is useful in  determining the stage of disease according to KDOQI CKD classification.  All eGFR results are reported numerically with the following  interpretation.          GFR                    With                 Without     (ml/min/1.73 m2)    Kidney Damage       Kidney Damage        >= 90                    Stage 1                     Normal        60-89                    Stage 2                     Decreased GFR        30-59     Stage 3                     Stage 3        15-29                    Stage 4                     Stage 4        < 15                      Stage 5                     Stage 5  Each stage of CKD assumes that the associated GFR level has been in  effect for at least 3 months. Determination of stages one and two (with  eGFR > 59 ml/min/m2) requires estimation of kidney damage for at least 3  months as defined by structural or functional abnormalities.  Limitations: All estimates of GFR will be less accurate for patients at  extremes of muscle mass (including but not limited to frail elderly,  critically ill, or cancer patients), those with unusual diets, and those  with conditions associated with reduced secretion or extrarenal  elimination of creatinine. The eGFR equation is not recommended for use  in patients with unstable creatinine levels.	  eGFR if : 90, [>=60 mL/min/1.73M2]	    04-Aug-2020 19:31, Magnesium, Serum	  Magnesium, Serum:    2.7, [1.6 - 2.6 mg/dL]	    04-Aug-2020 19:31, Troponin I, Serum	  Troponin I, Serum:    0.584, [0.015 - 0.045 ng/mL], High Sensitivity Troponin and new reference  range effective 7/6/2016  Called to Nurse/DoctorKeny Shine 08/04/20 20:57	  Coagulation:	    04-Aug-2020 19:31, Activated Partial Thromboplastin Time	  Activated Partial Thromboplastin Time: 28.0, [27.5 - 35.5 sec]	    04-Aug-2020 19:31, Prothrombin Time and INR, Plasma	  Prothrombin Time, Plasma: 12.9, [10.6 - 13.6 sec]	  INR: 1.11, [0.88 - 1.16 ratio], Recommended ranges for therapeutic INR:    2.0-3.0 for most medical and surgical thromboembolic states    2.0-3.0 for atrial fibrillation    2.0-3.0 for bileaflet mechanical valve in aortic position    2.5-3.5 for mechanical heart valves    Chest 2004;[id number]  The presence of direct thrombin inhibitors (argatroban, refludan)  may falsely increase results.	  Hematology:	    04-Aug-2020 19:31, Complete Blood Count + Automated Diff	  WBC Count: 8.48, [3.80 - 10.50 K/uL]	  RBC Count: 4.79, [3.80 - 5.20 M/uL]	  Hemoglobin: 13.5, [11.5 - 15.5 g/dL]	  Hematocrit: 42.9, [34.5 - 45.0 %]	  Mean Cell Volume: 89.6, [80.0 - 100.0 fl]	  Mean Cell Hemoglobin: 28.2, [27.0 - 34.0 pg]	  Mean Cell Hemoglobin Conc:    31.5, [32.0 - 36.0 gm/dL]	  Red Cell Distrib Width: 14.5, [10.3 - 14.5 %]	  Platelet Count - Automated:    434, [150 - 400 K/uL]	  Auto Neutrophil #: 7.09, [1.80 - 7.40 K/uL]	  Auto Lymphocyte #:    0.62, [1.00 - 3.30 K/uL]	  Auto Monocyte #: 0.56, [0.00 - 0.90 K/uL]	  Auto Eosinophil #: 0.12, [0.00 - 0.50 K/uL]	  Auto Basophil #: 0.07, [0.00 - 0.20 K/uL]	  Auto Neutrophil %:    83.7, [43.0 - 77.0 %], Differential percentages must be correlated with absolute numbers for  clinical significance.	  Auto Lymphocyte %:    7.3, [13.0 - 44.0 %]	  Auto Monocyte %: 6.6, [2.0 - 14.0 %]	  Auto Eosinophil %: 1.4, [0.0 - 6.0 %]	  Auto Basophil %: 0.8, [0.0 - 2.0 %]	  Auto Immature Granulocyte %: 0.2, [0.0 - 1.5 %]	    Radiology:   CT:	    04-Aug-2020 19:46, CT Head No Cont	  CT Head No Cont: EXAM:  CT BRAIN                            PROCEDURE DATE:  08/04/2020          INTERPRETATION:  CLINICAL INFORMATION: Syncope.    COMPARISON: None.    PROCEDURE:  Noncontrast CT of the Head was performed from the skull base to the vertex. Coronal and Sagittal reformats were obtained.    FINDINGS:    There is no acute intracranial hemorrhage, mass effect, midline shift, extra-axial collection, hydrocephalus, or evidence of acute vascular territorial infarction.    Mild patchy hypodensities within the periventricular and subcortical white matter, although nonspecific, likely reflect chronic microvascular disease. Cerebral volume loss results in prominence of the ventricles and sulci.    The visualized paranasal sinuses and mastoid air cells are clear. No calvarial fracture.    IMPRESSION:    No acute intracranial hemorrhage, mass effect, or evidence of acute vascular territorial infarction.    If clinical symptoms persist or worsen, more sensitive evaluation with brain MRI may be obtained, if no contraindications exist.              JERROD GRANDA M.D., ATTENDING RADIOLOGIST  This document has been electronically signed. Aug  4 2020  8:14PM	  X-Ray, Fluoroscopy:	  PACS Image: Image(s) Available	    Assessment and Plan:   Assessment:  · Assessment		  80 year old female patient with symptomatic bradycardia likely leading to syncopal episode        -Admit to CCU      #Symptomatic bradycardia  -pt currently asymptomatic  -was previously on levophed in the ED  -monitor in the CCU  -give atropine for symptomatic bradycardia  -IVF hydration  -get morning tsh, mag, phos  -get morning echo  -NPO for possible cardiology intervention  -EP to evaluate pt    #Syncope  -DDX: Vasovagal, orthostatic, structural heart disease, arrhythmias, electrolyte abnormalities  -monitor on tele  -IVF hydration   -morning morning echo  -cardiology to evaluate pt    #Elevated troponin  -trend troponin  -serial EKG  -get morning echo  -cardiology to evaluate pt    #Hx of Dementia  -on donepezil but will hold in lieu of bradycardia  -supportive care     #Advanced directives  -full code    #DVT ppx  -heparin sq    no evidence of ischemia either by clinical assessment or on exam.  No vascular intervention required nor further diagnostics.

## 2020-08-09 NOTE — CONSULT NOTE ADULT - REASON FOR ADMISSION
Symptomatic bradycardia  Syncope

## 2020-08-10 ENCOUNTER — TRANSCRIPTION ENCOUNTER (OUTPATIENT)
Age: 80
End: 2020-08-10

## 2020-08-10 VITALS
OXYGEN SATURATION: 92 % | HEART RATE: 75 BPM | TEMPERATURE: 98 F | SYSTOLIC BLOOD PRESSURE: 119 MMHG | DIASTOLIC BLOOD PRESSURE: 66 MMHG | RESPIRATION RATE: 18 BRPM

## 2020-08-10 LAB
HCT VFR BLD CALC: 31.6 % — LOW (ref 34.5–45)
HGB BLD-MCNC: 10.4 G/DL — LOW (ref 11.5–15.5)
MCHC RBC-ENTMCNC: 28.7 PG — SIGNIFICANT CHANGE UP (ref 27–34)
MCHC RBC-ENTMCNC: 32.9 GM/DL — SIGNIFICANT CHANGE UP (ref 32–36)
MCV RBC AUTO: 87.1 FL — SIGNIFICANT CHANGE UP (ref 80–100)
PLATELET # BLD AUTO: 390 K/UL — SIGNIFICANT CHANGE UP (ref 150–400)
RBC # BLD: 3.63 M/UL — LOW (ref 3.8–5.2)
RBC # FLD: 14.6 % — HIGH (ref 10.3–14.5)
WBC # BLD: 6.41 K/UL — SIGNIFICANT CHANGE UP (ref 3.8–10.5)
WBC # FLD AUTO: 6.41 K/UL — SIGNIFICANT CHANGE UP (ref 3.8–10.5)

## 2020-08-10 PROCEDURE — 99239 HOSP IP/OBS DSCHRG MGMT >30: CPT

## 2020-08-10 PROCEDURE — 99233 SBSQ HOSP IP/OBS HIGH 50: CPT

## 2020-08-10 RX ORDER — CLOPIDOGREL BISULFATE 75 MG/1
1 TABLET, FILM COATED ORAL
Qty: 30 | Refills: 0
Start: 2020-08-10

## 2020-08-10 RX ORDER — ATORVASTATIN CALCIUM 80 MG/1
1 TABLET, FILM COATED ORAL
Qty: 30 | Refills: 0
Start: 2020-08-10

## 2020-08-10 RX ORDER — ASPIRIN/CALCIUM CARB/MAGNESIUM 324 MG
1 TABLET ORAL
Qty: 0 | Refills: 0 | DISCHARGE
Start: 2020-08-10

## 2020-08-10 RX ADMIN — CHLORHEXIDINE GLUCONATE 1 APPLICATION(S): 213 SOLUTION TOPICAL at 09:00

## 2020-08-10 RX ADMIN — CLOPIDOGREL BISULFATE 75 MILLIGRAM(S): 75 TABLET, FILM COATED ORAL at 10:58

## 2020-08-10 RX ADMIN — Medication 81 MILLIGRAM(S): at 10:58

## 2020-08-10 RX ADMIN — HEPARIN SODIUM 5000 UNIT(S): 5000 INJECTION INTRAVENOUS; SUBCUTANEOUS at 10:58

## 2020-08-10 NOTE — PROGRESS NOTE ADULT - PROBLEM SELECTOR PLAN 2
on statin. Lipid panel done but result pending. (will fu as outpt.).
possibly due to IWMI, RCA lesion on IABP support 2:1 currently  , improving , encourage po  fluid intake ,  wean IABP  hold heparin one hour prior to dc of IABP
FLP ordered, continue statin.
FLP ordered, continue statin.
Provider to RN note entered to draw FLP ordered 2 days ago, cont. statin.

## 2020-08-10 NOTE — DISCHARGE NOTE PROVIDER - CARE PROVIDER_API CALL
Yousif Jesus  CARDIOVASCULAR DISEASE  [address] [place] [address]  Phone: [phone]  Fax: [phone]  Follow Up Time:

## 2020-08-10 NOTE — PROGRESS NOTE ADULT - REASON FOR ADMISSION
Symptomatic bradycardia  Syncope

## 2020-08-10 NOTE — DISCHARGE NOTE NURSING/CASE MANAGEMENT/SOCIAL WORK - PATIENT PORTAL LINK FT
You can access the FollowMyHealth Patient Portal offered by [facility] by registering at the following website: [email or web address]. By joining [facility]’s FollowMyHealth portal, you will also be able to view your health information using other applications (apps) compatible with our system.

## 2020-08-10 NOTE — DISCHARGE NOTE PROVIDER - HOSPITAL COURSE
80 year old female patient presented to the ED after a witnessed syncopal episode. Patient does not recall events surrounding loss of consciousness but states she was notified by her  that she appeared as though she was about to fall but was caught by her  and son before going limp. Patient with months long history of chest pain but denies any palpitations, dizziness, sob, peripheral edema, abdominal pain, nausea or vomiting.  In the ED patient with sinus bradycardia on telemetry ranging from mid 30s to 50s with occasional PVC. Patient was started on levophed by ED provider. (05 Aug 2020 01:08)    BP imroved , has IABP removed 8/7   HRs in the 56. asymptomatic. No arrhtyhmias on tele.        8/8: ABove reviewed; patient has no complaints; feeling better; Overnight her feet was cold and unable to get pulses; venous and arterial doppler was negative     8/9:  Patient has no complaints.  Seen by vascular and cleared; Awaiting PT evaluation Discussed with son    8.10: no distress, able to ambulate with PT        REVIEW OF SYSTEMS:    See HPI. All other review of systems is negative unless indicated above.         Vital Signs Last 24 Hrs    T(C): 36.7 (10 Aug 2020 08:14), Max: 36.9 (09 Aug 2020 17:17)    T(F): 98.1 (10 Aug 2020 08:14), Max: 98.4 (09 Aug 2020 17:17)    HR: 75 (10 Aug 2020 08:14) (68 - 83)    BP: 119/66 (10 Aug 2020 08:14) (108/59 - 119/66)    RR: 18 (10 Aug 2020 08:14) (18 - 18)    SpO2: 92% (10 Aug 2020 08:14) (92% - 95%)        Constitutional: NAD, awake and alert, well-developed    Neurological: AAO x 3, no focal deficits    HEENT: PERRLA, EOMI, MMM    Neck: Soft and supple, No LAD, No JVD    Respiratory: Breath sounds are clear bilaterally, No wheezing, rales or rhonchi    Cardiovascular: S1 and S2, regular rate and rhythm; no Murmurs, gallops or rubs    Gastrointestinal: Bowel Sounds present, soft, nontender, nondistended, no guarding, no rebound tenderness    Back: No CVA tenderness     Extremities: No peripheral edema; Left groin: no hematoma    Musculoskeletal: 5/5 strength b/l upper and lower extremities    Skin: No rashes    Breast: Deferred    Rectal: Deferred·         a/p:         # NSTEMI: s/p thrombectomy, s/p IABP, s/p mid RCA stent x1     - DAPT / STATIN    - IABP removed 8/6     - cardio consult appreciated : cleared for d/c    - PT evaluation for discharge planning     - follow up with Dr. Jesus in 4-7 days at time of discharge    - Cont. ASA 81, plavix 75, atorva 40, no BB given bradycardia        # bradycardic / hypotensive secondary to ischemia    ON IVF     avoid AVN blocking agents     - s/p cath         #Decrease Pulses in B/L LE R/O Pseudoaneurysm ?PVD    -Arterial and Venous Doppler Neg    -Vascular consult appreciated no evidence of ischemia either by clinical assessment or on exam.  No vascular intervention required nor further diagnostics.          # Hypernatremia  improved with IVF    Dced IVF    encourage po intake         dc home, time 39m

## 2020-08-10 NOTE — DISCHARGE NOTE PROVIDER - NSDCMRMEDTOKEN_GEN_ALL_CORE_FT
aspirin 81 mg oral tablet, chewable: 1 tab(s) orally once a day  atorvastatin 40 mg oral tablet: 1 tab(s) orally once a day (at bedtime)  clopidogrel 75 mg oral tablet: 1 tab(s) orally once a day  donepezil 10 mg oral tablet: 1 tab(s) orally once a day (at bedtime)  pravastatin 40 mg oral tablet: 1 tab(s) orally once a day  telmisartan-hydrochlorothiazide 80mg-12.5mg oral tablet: 1 tab(s) orally once a day

## 2020-08-10 NOTE — PROGRESS NOTE ADULT - PROBLEM SELECTOR PLAN 1
Inferior MI (NSTEMI) with RV involvement and now improved. BP WNL.  No ACE or BB due to hypotension.  Continue DAP and out pt. fu in 2 weeks.

## 2020-08-10 NOTE — PROGRESS NOTE ADULT - PROVIDER SPECIALTY LIST ADULT
Cardiology
Critical Care
Hospitalist
Cardiology
Cardiology

## 2020-08-10 NOTE — PROGRESS NOTE ADULT - SUBJECTIVE AND OBJECTIVE BOX
80 year old female patient presented to the ED after a witnessed syncopal episode.  patient was noted to have intermittent hypotension   episodes of bradycardia , noted to have borderline elevated ST , subsequent cardiac cath   RCA  stent , patient was placed on IABP for 24 hrs removed 8/7.  Stable over 72 hrs.  Vascular evaluation today for diminished pedal pulses w/ normal vascualr studies  found no evidence of ischemia by clinical assessment or on exam.  no vascular intervention or diagnostic required.  Pt denies any symptoms overnight: no chest pain, dyspnea etc.  No ambulating w/ assistance yet.    8/10/20: feels much better.  Denies chest pain, orthopnea, PND or LE edema.  BP much better as well.     MEDICATIONS  (STANDING):  aspirin  chewable 81 milliGRAM(s) Chew daily  atorvastatin 40 milliGRAM(s) Oral at bedtime  chlorhexidine 4% Liquid 1 Application(s) Topical <User Schedule>  clopidogrel Tablet 75 milliGRAM(s) Oral daily  heparin   Injectable 5000 Unit(s) SubCutaneous every 12 hours    MEDICATIONS  (PRN):  acetaminophen   Tablet .. 650 milliGRAM(s) Oral every 4 hours PRN Mild Pain (1 - 3)  atropine Injectable 0.5 milliGRAM(s) IV Push once PRN for symptomatic bradycardia  ondansetron Injectable 4 milliGRAM(s) IV Push every 6 hours PRN Nausea      Vital Signs Last 24 Hrs  T(C): 36.7 (10 Aug 2020 08:14), Max: 36.9 (09 Aug 2020 17:17)  T(F): 98.1 (10 Aug 2020 08:14), Max: 98.4 (09 Aug 2020 17:17)  HR: 75 (10 Aug 2020 08:14) (68 - 83)  BP: 119/66 (10 Aug 2020 08:14) (108/59 - 119/66)  BP(mean): --  RR: 18 (10 Aug 2020 08:14) (18 - 18)  SpO2: 92% (10 Aug 2020 08:14) (92% - 95%)    I&O's Detail      Daily     Daily       PHYSICAL EXAM:    Constitutional: NAD, awake  HEENT: PERR, EOMI,    Neck:  supple,  No JVD  Respiratory: Breath sounds are clear bilaterally, No wheezing, rales or rhonchi  Cardiovascular: S1 and S2, regular rate and rhythm, no Murmurs, gallops or rubs  Gastrointestinal: Bowel Sounds present, soft, nontender.   Extremities: No peripheral edema. No clubbing or cyanosis.  Vascular: 1+ peripheral pulses  no hematoma at access sites for IABP & cath.  Neurological: A/O x 3, no focal deficits  Musculoskeletal: no calf tenderness.  Skin: No rashes.      LABS: All Labs Reviewed:                          10.4   6.41  )-----------( 390      ( 10 Aug 2020 07:57 )             31.6     08-09    142  |  112<H>  |  9   ----------------------------<  80  3.7   |  24  |  0.46<L>    Ca    8.1<L>      09 Aug 2020 07:29  Phos  3.0     08-09  Mg     1.8     08-09    ===============================  EKG: NSR, Recent inferior MI.    TELE: no arrhythmias.  ==============================  RADIOLOGY:  < from: Xray Chest 1 View-PORTABLE IMMEDIATE (08.04.20 @ 19:49) >  PROCEDURE DATE:  08/04/2020    INTERPRETATION:  AP erect chest on August 4, 2020 at 7:45 PM. Covid virus test was negative on July 11. Repeat is pending. Patient had a syncopal episode. There is history of dementia. Bradycardia was also detected.    Heart is normal for projection.  The lung fields and pleural surfaces are unremarkable.  There is mild stomach distention with gas.  Chest is similar to September 15, 2010.    IMPRESSION: No acute finding or change.  ==============================  ECHO:  < from: TTE Echo Complete w/o Contrast w/ Doppler (08.05.20 @ 09:42) >   Impression     Summary     Fibrocalcific changes noted to the mitral valve leaflets with preserved   leaflet excursion.   Minor mitral annular calcification is present.   Mild (1+) mitral regurgitation is present.   Fibrocalcific changes noted to the Aortic valve leaflets with preserved   leaflet excursion.   Trace aortic regurgitation is present.   ***s/p balloon pump insertion.   The left ventricle is normal in size, wall thickness, wall motion and   contractility   Estimated left ventricular ejection fraction is 55-60 %.  ==============================  CATH:    < from: Cardiac Cath Lab - Adult (08.05.20 @ 07:21) >   Cardiac Arteries and Lesion Findings    LMCA: Diffuse irregularity.There is mild diffuse disease noted.    LAD: Diffuse irregularity.There is mild diffuse disease noted.    LCx: Diffuse irregularity.There is mild diffuse disease noted.    RCA: Diffuse irregularity.   Mid RCA: Distal subsection.100% stenosis 26 mm length reduced to 0%.Pre   procedure JOE 0 flow was noted. Good runoff was present.The lesion was   diagnosed as a high risk lesion.The lesion was diffuse.The lesion showed   evidence of thrombus presence, moderate angulation and mild tortuosity.   Post Procedure JOE III flow was present.   Treatment results:Interventional treatment was successful.   Comments:IVUS showed suboptimal stent apposition post deployment. It was   then post dilated with a larger balloon.     Diameter: 3 mm. Length: 20 mm. Total duration: 26 sec.2 inflation(s). to   a max pressure of: 8 CARRIE.   * Synergy DESTINY Stent   Diameter: 3 mm. Length: 32 mm. Total duration: 18 sec.1 inflation(s). to   a max pressure of: 16 CARRIE.    Valves  +------+----------------------------+----------------------------+---------+  !Valve !Stenosis                    !Insufficiency!Comments !  +------+----------------------------+----------------------------+---------+  !Aortic!No                          !Not assessed                !         !  +------+----------------------------+----------------------------+---------+  !Mitral!Not assessed                !No insufficiency            !         !  +------+----------------------------+----------------------------+---------+    LV function assessed as:Abnormal.  Ejection Fraction  +----------------------------------------------------------------------+---+  !Method                                                                !EF%!  +----------------------------------------------------------------------+---+  !LV gram           !50 !  +----------------------------------------------------------------------+---+    VA  Ventriculography Findings:  Low Normal left ventricular systolic function.     Coronary tree   Dominance: Right   Impression     Diagnostic Conclusions   1 vessel CAd with NSTEMI that progressed to RV infarct and cardiogenic   shock requiring Levophed support. IABP placed and LV FX showed EF 50%.     Interventional Conclusions     Successful PCI of RCA   IABP placed prior to intervention for cardiogenic shock     Recommendations     Manage with medical therapy.   IABP weaning off over the next 24 hrs.    Estimated Blood Loss:5ml.  ==============================

## 2020-08-11 DIAGNOSIS — Z86.79 PERSONAL HISTORY OF OTHER DISEASES OF THE CIRCULATORY SYSTEM: ICD-10-CM

## 2020-08-11 DIAGNOSIS — Z86.39 PERSONAL HISTORY OF OTHER ENDOCRINE, NUTRITIONAL AND METABOLIC DISEASE: ICD-10-CM

## 2020-08-11 DIAGNOSIS — R55 SYNCOPE AND COLLAPSE: ICD-10-CM

## 2020-08-11 RX ORDER — ASPIRIN 81 MG
81 TABLET, DELAYED RELEASE (ENTERIC COATED) ORAL DAILY
Refills: 3 | Status: ACTIVE | COMMUNITY

## 2020-08-11 RX ORDER — DONEPEZIL HYDROCHLORIDE 10 MG/1
10 TABLET ORAL DAILY
Qty: 30 | Refills: 2 | Status: ACTIVE | COMMUNITY

## 2020-08-13 PROBLEM — M20.61: Chronic | Status: ACTIVE | Noted: 2020-08-04

## 2020-08-14 ENCOUNTER — NON-APPOINTMENT (OUTPATIENT)
Age: 80
End: 2020-08-14

## 2020-08-17 ENCOUNTER — EMERGENCY (EMERGENCY)
Facility: HOSPITAL | Age: 80
LOS: 0 days | Discharge: HOME | End: 2020-08-18
Attending: HOSPITALIST
Payer: MEDICARE

## 2020-08-17 VITALS
RESPIRATION RATE: 18 BRPM | DIASTOLIC BLOOD PRESSURE: 91 MMHG | OXYGEN SATURATION: 99 % | SYSTOLIC BLOOD PRESSURE: 137 MMHG | HEART RATE: 74 BPM | TEMPERATURE: 98 F

## 2020-08-17 DIAGNOSIS — I21.4 NON-ST ELEVATION (NSTEMI) MYOCARDIAL INFARCTION: ICD-10-CM

## 2020-08-17 DIAGNOSIS — I10 ESSENTIAL (PRIMARY) HYPERTENSION: ICD-10-CM

## 2020-08-17 DIAGNOSIS — M79.89 OTHER SPECIFIED SOFT TISSUE DISORDERS: ICD-10-CM

## 2020-08-17 DIAGNOSIS — M25.531 PAIN IN RIGHT WRIST: ICD-10-CM

## 2020-08-17 DIAGNOSIS — Z95.1 PRESENCE OF AORTOCORONARY BYPASS GRAFT: ICD-10-CM

## 2020-08-17 DIAGNOSIS — Z79.899 OTHER LONG TERM (CURRENT) DRUG THERAPY: ICD-10-CM

## 2020-08-17 DIAGNOSIS — M79.631 PAIN IN RIGHT FOREARM: ICD-10-CM

## 2020-08-17 DIAGNOSIS — Z79.82 LONG TERM (CURRENT) USE OF ASPIRIN: ICD-10-CM

## 2020-08-17 DIAGNOSIS — E78.5 HYPERLIPIDEMIA, UNSPECIFIED: ICD-10-CM

## 2020-08-17 DIAGNOSIS — Z95.5 PRESENCE OF CORONARY ANGIOPLASTY IMPLANT AND GRAFT: ICD-10-CM

## 2020-08-17 DIAGNOSIS — I25.10 ATHEROSCLEROTIC HEART DISEASE OF NATIVE CORONARY ARTERY WITHOUT ANGINA PECTORIS: ICD-10-CM

## 2020-08-17 DIAGNOSIS — M11.231 OTHER CHONDROCALCINOSIS, RIGHT WRIST: ICD-10-CM

## 2020-08-17 DIAGNOSIS — Z01.84 ENCOUNTER FOR ANTIBODY RESPONSE EXAMINATION: ICD-10-CM

## 2020-08-17 LAB
ADD ON TEST-SPECIMEN IN LAB: SIGNIFICANT CHANGE UP
ALBUMIN SERPL ELPH-MCNC: 3.2 G/DL — LOW (ref 3.3–5)
ALP SERPL-CCNC: 84 U/L — SIGNIFICANT CHANGE UP (ref 40–120)
ALT FLD-CCNC: 38 U/L — SIGNIFICANT CHANGE UP (ref 12–78)
ANION GAP SERPL CALC-SCNC: 5 MMOL/L — SIGNIFICANT CHANGE UP (ref 5–17)
APPEARANCE UR: CLEAR — SIGNIFICANT CHANGE UP
APTT BLD: 35.3 SEC — SIGNIFICANT CHANGE UP (ref 27.5–35.5)
AST SERPL-CCNC: 19 U/L — SIGNIFICANT CHANGE UP (ref 15–37)
BASOPHILS # BLD AUTO: 0.07 K/UL — SIGNIFICANT CHANGE UP (ref 0–0.2)
BASOPHILS NFR BLD AUTO: 0.7 % — SIGNIFICANT CHANGE UP (ref 0–2)
BILIRUB SERPL-MCNC: 0.5 MG/DL — SIGNIFICANT CHANGE UP (ref 0.2–1.2)
BILIRUB UR-MCNC: NEGATIVE — SIGNIFICANT CHANGE UP
BUN SERPL-MCNC: 12 MG/DL — SIGNIFICANT CHANGE UP (ref 7–23)
CALCIUM SERPL-MCNC: 8.8 MG/DL — SIGNIFICANT CHANGE UP (ref 8.5–10.1)
CHLORIDE SERPL-SCNC: 108 MMOL/L — SIGNIFICANT CHANGE UP (ref 96–108)
CO2 SERPL-SCNC: 29 MMOL/L — SIGNIFICANT CHANGE UP (ref 22–31)
COLOR SPEC: YELLOW — SIGNIFICANT CHANGE UP
CREAT SERPL-MCNC: 0.62 MG/DL — SIGNIFICANT CHANGE UP (ref 0.5–1.3)
CRP SERPL-MCNC: 1.16 MG/DL — HIGH (ref 0–0.4)
DIFF PNL FLD: NEGATIVE — SIGNIFICANT CHANGE UP
EOSINOPHIL # BLD AUTO: 0.17 K/UL — SIGNIFICANT CHANGE UP (ref 0–0.5)
EOSINOPHIL NFR BLD AUTO: 1.8 % — SIGNIFICANT CHANGE UP (ref 0–6)
GLUCOSE SERPL-MCNC: 84 MG/DL — SIGNIFICANT CHANGE UP (ref 70–99)
GLUCOSE UR QL: NEGATIVE MG/DL — SIGNIFICANT CHANGE UP
HCT VFR BLD CALC: 38.8 % — SIGNIFICANT CHANGE UP (ref 34.5–45)
HGB BLD-MCNC: 13 G/DL — SIGNIFICANT CHANGE UP (ref 11.5–15.5)
IMM GRANULOCYTES NFR BLD AUTO: 0.4 % — SIGNIFICANT CHANGE UP (ref 0–1.5)
INR BLD: 1.17 RATIO — HIGH (ref 0.88–1.16)
KETONES UR-MCNC: NEGATIVE — SIGNIFICANT CHANGE UP
LEUKOCYTE ESTERASE UR-ACNC: NEGATIVE — SIGNIFICANT CHANGE UP
LYMPHOCYTES # BLD AUTO: 0.57 K/UL — LOW (ref 1–3.3)
LYMPHOCYTES # BLD AUTO: 6.1 % — LOW (ref 13–44)
MCHC RBC-ENTMCNC: 29.7 PG — SIGNIFICANT CHANGE UP (ref 27–34)
MCHC RBC-ENTMCNC: 33.5 GM/DL — SIGNIFICANT CHANGE UP (ref 32–36)
MCV RBC AUTO: 88.6 FL — SIGNIFICANT CHANGE UP (ref 80–100)
MONOCYTES # BLD AUTO: 0.67 K/UL — SIGNIFICANT CHANGE UP (ref 0–0.9)
MONOCYTES NFR BLD AUTO: 7.1 % — SIGNIFICANT CHANGE UP (ref 2–14)
NEUTROPHILS # BLD AUTO: 7.89 K/UL — HIGH (ref 1.8–7.4)
NEUTROPHILS NFR BLD AUTO: 83.9 % — HIGH (ref 43–77)
NITRITE UR-MCNC: NEGATIVE — SIGNIFICANT CHANGE UP
PH UR: 7 — SIGNIFICANT CHANGE UP (ref 5–8)
PLATELET # BLD AUTO: 575 K/UL — HIGH (ref 150–400)
POTASSIUM SERPL-MCNC: 3.7 MMOL/L — SIGNIFICANT CHANGE UP (ref 3.5–5.3)
POTASSIUM SERPL-SCNC: 3.7 MMOL/L — SIGNIFICANT CHANGE UP (ref 3.5–5.3)
PROT SERPL-MCNC: 7.3 GM/DL — SIGNIFICANT CHANGE UP (ref 6–8.3)
PROT UR-MCNC: NEGATIVE MG/DL — SIGNIFICANT CHANGE UP
PROTHROM AB SERPL-ACNC: 13.6 SEC — SIGNIFICANT CHANGE UP (ref 10.6–13.6)
RBC # BLD: 4.38 M/UL — SIGNIFICANT CHANGE UP (ref 3.8–5.2)
RBC # FLD: 14.5 % — SIGNIFICANT CHANGE UP (ref 10.3–14.5)
SODIUM SERPL-SCNC: 142 MMOL/L — SIGNIFICANT CHANGE UP (ref 135–145)
SP GR SPEC: 1 — LOW (ref 1.01–1.02)
UROBILINOGEN FLD QL: NEGATIVE MG/DL — SIGNIFICANT CHANGE UP
WBC # BLD: 9.41 K/UL — SIGNIFICANT CHANGE UP (ref 3.8–10.5)
WBC # FLD AUTO: 9.41 K/UL — SIGNIFICANT CHANGE UP (ref 3.8–10.5)

## 2020-08-17 PROCEDURE — 85027 COMPLETE CBC AUTOMATED: CPT

## 2020-08-17 PROCEDURE — 93010 ELECTROCARDIOGRAM REPORT: CPT

## 2020-08-17 PROCEDURE — 86769 SARS-COV-2 COVID-19 ANTIBODY: CPT

## 2020-08-17 PROCEDURE — 85730 THROMBOPLASTIN TIME PARTIAL: CPT

## 2020-08-17 PROCEDURE — 85025 COMPLETE CBC W/AUTO DIFF WBC: CPT

## 2020-08-17 PROCEDURE — 99220: CPT

## 2020-08-17 PROCEDURE — 99285 EMERGENCY DEPT VISIT HI MDM: CPT

## 2020-08-17 PROCEDURE — 36415 COLL VENOUS BLD VENIPUNCTURE: CPT

## 2020-08-17 PROCEDURE — 86140 C-REACTIVE PROTEIN: CPT

## 2020-08-17 PROCEDURE — 85652 RBC SED RATE AUTOMATED: CPT

## 2020-08-17 PROCEDURE — G0378: CPT

## 2020-08-17 PROCEDURE — 87086 URINE CULTURE/COLONY COUNT: CPT

## 2020-08-17 PROCEDURE — 80053 COMPREHEN METABOLIC PANEL: CPT

## 2020-08-17 PROCEDURE — 84550 ASSAY OF BLOOD/URIC ACID: CPT

## 2020-08-17 PROCEDURE — 73110 X-RAY EXAM OF WRIST: CPT | Mod: 26,RT

## 2020-08-17 PROCEDURE — 99285 EMERGENCY DEPT VISIT HI MDM: CPT | Mod: 25

## 2020-08-17 PROCEDURE — 85610 PROTHROMBIN TIME: CPT

## 2020-08-17 PROCEDURE — 73110 X-RAY EXAM OF WRIST: CPT | Mod: RT

## 2020-08-17 PROCEDURE — 93005 ELECTROCARDIOGRAM TRACING: CPT

## 2020-08-17 PROCEDURE — 81003 URINALYSIS AUTO W/O SCOPE: CPT

## 2020-08-17 PROCEDURE — 96374 THER/PROPH/DIAG INJ IV PUSH: CPT

## 2020-08-17 PROCEDURE — 87040 BLOOD CULTURE FOR BACTERIA: CPT

## 2020-08-17 PROCEDURE — U0003: CPT

## 2020-08-17 PROCEDURE — 96375 TX/PRO/DX INJ NEW DRUG ADDON: CPT

## 2020-08-17 RX ORDER — DONEPEZIL HYDROCHLORIDE 10 MG/1
1 TABLET, FILM COATED ORAL
Qty: 0 | Refills: 0 | DISCHARGE

## 2020-08-17 RX ORDER — ACETAMINOPHEN 500 MG
650 TABLET ORAL EVERY 4 HOURS
Refills: 0 | Status: DISCONTINUED | OUTPATIENT
Start: 2020-08-17 | End: 2020-08-18

## 2020-08-17 RX ORDER — VANCOMYCIN HCL 1 G
1000 VIAL (EA) INTRAVENOUS ONCE
Refills: 0 | Status: DISCONTINUED | OUTPATIENT
Start: 2020-08-17 | End: 2020-08-17

## 2020-08-17 RX ORDER — TELMISARTAN AND HYDROCHLOROTHIAZIDE 40; 12.5 MG/1; MG/1
1 TABLET ORAL
Qty: 0 | Refills: 0 | DISCHARGE

## 2020-08-17 RX ORDER — CEFTRIAXONE 500 MG/1
1000 INJECTION, POWDER, FOR SOLUTION INTRAMUSCULAR; INTRAVENOUS EVERY 24 HOURS
Refills: 0 | Status: DISCONTINUED | OUTPATIENT
Start: 2020-08-17 | End: 2020-08-18

## 2020-08-17 RX ORDER — ONDANSETRON 8 MG/1
4 TABLET, FILM COATED ORAL EVERY 6 HOURS
Refills: 0 | Status: DISCONTINUED | OUTPATIENT
Start: 2020-08-17 | End: 2020-08-18

## 2020-08-17 RX ORDER — DONEPEZIL HYDROCHLORIDE 10 MG/1
10 TABLET, FILM COATED ORAL AT BEDTIME
Refills: 0 | Status: DISCONTINUED | OUTPATIENT
Start: 2020-08-17 | End: 2020-08-18

## 2020-08-17 RX ORDER — SENNA PLUS 8.6 MG/1
2 TABLET ORAL AT BEDTIME
Refills: 0 | Status: DISCONTINUED | OUTPATIENT
Start: 2020-08-17 | End: 2020-08-18

## 2020-08-17 RX ORDER — ASPIRIN/CALCIUM CARB/MAGNESIUM 324 MG
81 TABLET ORAL DAILY
Refills: 0 | Status: DISCONTINUED | OUTPATIENT
Start: 2020-08-17 | End: 2020-08-18

## 2020-08-17 RX ORDER — OXYCODONE AND ACETAMINOPHEN 5; 325 MG/1; MG/1
1 TABLET ORAL EVERY 6 HOURS
Refills: 0 | Status: DISCONTINUED | OUTPATIENT
Start: 2020-08-17 | End: 2020-08-18

## 2020-08-17 RX ORDER — ATORVASTATIN CALCIUM 80 MG/1
40 TABLET, FILM COATED ORAL AT BEDTIME
Refills: 0 | Status: DISCONTINUED | OUTPATIENT
Start: 2020-08-17 | End: 2020-08-18

## 2020-08-17 RX ORDER — ENOXAPARIN SODIUM 100 MG/ML
40 INJECTION SUBCUTANEOUS DAILY
Refills: 0 | Status: DISCONTINUED | OUTPATIENT
Start: 2020-08-17 | End: 2020-08-18

## 2020-08-17 RX ORDER — ZALEPLON 10 MG
5 CAPSULE ORAL AT BEDTIME
Refills: 0 | Status: DISCONTINUED | OUTPATIENT
Start: 2020-08-17 | End: 2020-08-18

## 2020-08-17 RX ORDER — LOSARTAN POTASSIUM 100 MG/1
25 TABLET, FILM COATED ORAL DAILY
Refills: 0 | Status: DISCONTINUED | OUTPATIENT
Start: 2020-08-17 | End: 2020-08-18

## 2020-08-17 RX ORDER — CLOPIDOGREL BISULFATE 75 MG/1
75 TABLET, FILM COATED ORAL DAILY
Refills: 0 | Status: DISCONTINUED | OUTPATIENT
Start: 2020-08-17 | End: 2020-08-18

## 2020-08-17 RX ORDER — VANCOMYCIN HCL 1 G
750 VIAL (EA) INTRAVENOUS ONCE
Refills: 0 | Status: COMPLETED | OUTPATIENT
Start: 2020-08-17 | End: 2020-08-17

## 2020-08-17 RX ORDER — ACETAMINOPHEN 500 MG
650 TABLET ORAL ONCE
Refills: 0 | Status: COMPLETED | OUTPATIENT
Start: 2020-08-17 | End: 2020-08-17

## 2020-08-17 RX ORDER — HYDROCHLOROTHIAZIDE 25 MG
12.5 TABLET ORAL DAILY
Refills: 0 | Status: DISCONTINUED | OUTPATIENT
Start: 2020-08-17 | End: 2020-08-18

## 2020-08-17 RX ADMIN — CEFTRIAXONE 1000 MILLIGRAM(S): 500 INJECTION, POWDER, FOR SOLUTION INTRAMUSCULAR; INTRAVENOUS at 16:05

## 2020-08-17 RX ADMIN — LOSARTAN POTASSIUM 25 MILLIGRAM(S): 100 TABLET, FILM COATED ORAL at 16:05

## 2020-08-17 RX ADMIN — Medication 650 MILLIGRAM(S): at 12:45

## 2020-08-17 RX ADMIN — CLOPIDOGREL BISULFATE 75 MILLIGRAM(S): 75 TABLET, FILM COATED ORAL at 16:05

## 2020-08-17 RX ADMIN — ATORVASTATIN CALCIUM 40 MILLIGRAM(S): 80 TABLET, FILM COATED ORAL at 20:55

## 2020-08-17 RX ADMIN — Medication 100 MILLIGRAM(S): at 12:45

## 2020-08-17 RX ADMIN — Medication 250 MILLIGRAM(S): at 16:05

## 2020-08-17 RX ADMIN — DONEPEZIL HYDROCHLORIDE 10 MILLIGRAM(S): 10 TABLET, FILM COATED ORAL at 20:55

## 2020-08-17 RX ADMIN — Medication 81 MILLIGRAM(S): at 16:05

## 2020-08-17 RX ADMIN — ENOXAPARIN SODIUM 40 MILLIGRAM(S): 100 INJECTION SUBCUTANEOUS at 16:05

## 2020-08-17 NOTE — H&P ADULT - ASSESSMENT
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  Pain control/cold compresses  Ortho consult  Resume home meds  Full code  DVT px  IMPROVE VTE Individual Risk Assessment    RISK                                                                Points    [  ] Previous VTE                                                  3    [  ] Thrombophilia                                               2    [  ] Lower limb paralysis                                      2        (unable to hold up >15 seconds)      [  ] Current Cancer                                              2         (within 6 months)    x[  ] Immobilization > 24 hrs                                1    [  ] ICU/CCU stay > 24 hours                              1    [x  ] Age > 60                                                      1    IMPROVE VTE Score ____2_____-> LMWH    IMPROVE Score 0-1: Low Risk, No VTE prophylaxis required for most patients, encourage ambulation.   IMPROVE Score 2-3: At risk, pharmacologic VTE prophylaxis is indicated for most patients (in the absence of a contraindication)  IMPROVE Score > or = 4: High Risk, pharmacologic VTE prophylaxis is indicated for most patients (in the absence of a contraindication)

## 2020-08-17 NOTE — ED PROVIDER NOTE - NS_ ATTENDINGSCRIBEDETAILS _ED_A_ED_FT
Tameka Wood MD: The history, relevant review of systems, past medical and surgical history, medical decision making, and physical examination was documented by the scribe in my presence and I attest to the accuracy of the documentation.

## 2020-08-17 NOTE — ED PROVIDER NOTE - OBJECTIVE STATEMENT
81 y/o F presents to ED c/o right arm pain and warmth. Pt reports denies chest pain, SOB, weakness. Pt with recent admission to [facility] for cath, had MI x1 week ago. Denies any other sx currently. 81 y/o F presents to ED c/o right wrist pain. Pt called Dr. Jesus's office recommended ED evaluation. States wrist has been hurting since this am with +redness and +swelling. Denies chest pain, SOB, weakness, fevers. Pt with recent admission to [facility] for cath percutaneous site right groin. Denies any other sx currently.

## 2020-08-17 NOTE — ED PROVIDER NOTE - CLINICAL SUMMARY MEDICAL DECISION MAKING FREE TEXT BOX
80F with right wrist cellulitis unlikely septic joint, however will admit for IV abx and pain control.

## 2020-08-17 NOTE — H&P ADULT - NSHPLABSRESULTS_GEN_ALL_CORE
LABS: All Labs Reviewed:                        13.0   9.41  )-----------( 575      ( 17 Aug 2020 10:54 )             38.8     08-17    142  |  108  |  12  ----------------------------<  84  3.7   |  29  |  0.62    Ca    8.8      17 Aug 2020 10:54    TPro  7.3  /  Alb  3.2<L>  /  TBili  0.5  /  DBili  x   /  AST  19  /  ALT  38  /  AlkPhos  84  08-17    PT/INR - ( 17 Aug 2020 10:54 )   PT: 13.6 sec;   INR: 1.17 ratio         PTT - ( 17 Aug 2020 10:54 )  PTT:35.3 sec          Blood Culture:             < from: Xray Wrist 3 Views, Right (08.17.20 @ 12:20) >      IMPRESSION:   degenerative changes with narrowing at the radiocarpal articulation and first carpal metacarpal and carpal carpal joints. There is a. Small erosion at the distal scaphoid. There is soft tissue calcification of the TFCC as well as scattered ligaments. FINDINGS suspicious for CPPD.   Moderate soft tissue swelling is noted.    < end of copied text >    EKG: NSR, TWI in II, III, aVF which are old and chronic

## 2020-08-17 NOTE — H&P ADULT - HISTORY OF PRESENT ILLNESS
79 yo F with pmh CAD, recent NSTEMI s/p thrombectomy, IABP and mid rca stent, ?dementia, htn, hyperlipidemia p/w c/o right wrist pain x 1 day. States she woke up with tender swollen right wrist with limited rom.  Denies striking arm/wrist, no bites or stings. Patient seems like a somewhat unreliable historian and forgetful during exam. OF note patient on donepezil. No family at bedside. Denies fevers or chills. Has subacute puncture wouunds and bruising to right wrist which were likely from her recent admission for NSTEMI. Access for C was via the groin and not via radial approach. She is non toxic appearing. Denies use of abx.         PAST MEDICAL/SURGICAL/FAMILY/SOCIAL HISTORY:    Past Medical History:  Deformity of toe of right foot    No pertinent past medical history.     Past Surgical History:  No significant past surgical history.     Tobacco Usage:  · Tobacco Usage	non smoker

## 2020-08-17 NOTE — H&P ADULT - NSHPPHYSICALEXAM_GEN_ALL_CORE
ICU Vital Signs Last 24 Hrs  T(C): 36.7 (17 Aug 2020 10:45), Max: 36.7 (17 Aug 2020 10:45)  T(F): 98.1 (17 Aug 2020 10:45), Max: 98.1 (17 Aug 2020 10:45)  HR: 74 (17 Aug 2020 10:45) (74 - 74)  BP: 137/91 (17 Aug 2020 10:45) (137/91 - 137/91)  BP(mean): 98 (17 Aug 2020 10:45) (98 - 98)  ABP: --  ABP(mean): --  RR: 18 (17 Aug 2020 10:45) (18 - 18)  SpO2: 99% (17 Aug 2020 10:45) (99% - 99%)      PHYSICAL EXAM:    Constitutional: NAD, awake and alert, well-developed  HEENT: PERR, EOMI, Normal Hearing, MMM  Neck: Soft and supple, No LAD, No JVD  Respiratory: Breath sounds are clear bilaterally, No wheezing, rales or rhonchi  Cardiovascular: S1 and S2, regular rate and rhythm, no Murmurs, gallops or rubs  Gastrointestinal: Bowel Sounds present, soft, nontender, nondistended, no guarding, no rebound  Extremities: No peripheral edema  Vascular: 2+ peripheral pulses  Neurological: A/O x 2-3, no focal deficits  Musculoskeletal: 5/5 strength b/l upper and lower extremities  Skin: redness and warmth to right wrist on dorsalmedial aspect with asscoiated swelling and limited passive and active ROM; good cap refill, small effusion to MCP

## 2020-08-17 NOTE — ED PROVIDER NOTE - MUSCULOSKELETAL, MLM
Spine appears normal, range of motion is not limited, no muscle or joint tenderness Spine appears normal. +right wrist redness and swelling with warmth, full passive ROM, good pulses, normal color

## 2020-08-17 NOTE — ED ADULT NURSE NOTE - OBJECTIVE STATEMENT
Pt presents to the ED c/o R wrist pain that started last night. R wrist with mild redness/warmth-mild swelling. Pt denies trauma, numbness/tingling. Pulses strong, cap refill intact. Pt recently had a cardiac cath 1 week ago, unsure what site they used. Pt connected to heart monitor at this time. Denies chest pain/dizziness. Educated on call bell use and safety measures in place.

## 2020-08-17 NOTE — ED ADULT NURSE NOTE - NSIMPLEMENTINTERV_GEN_ALL_ED
Implemented All Universal Safety Interventions:  [place] to call system. Call bell, personal items and telephone within reach. Instruct patient to call for assistance. Room bathroom lighting operational. Non-slip footwear when patient is off stretcher. Physically safe environment: no spills, clutter or unnecessary equipment. Stretcher in lowest position, wheels locked, appropriate side rails in place.

## 2020-08-17 NOTE — ED ADULT NURSE NOTE - TEMPLATE
7915) Discuss with Dr. Rakel Pearce.  Call to pt, to discuss recommendation from Dr. Esther Adhikari, follow-up with Nely Lang, cardiothoracic surgeon, possible [facility]  
 General

## 2020-08-17 NOTE — ED ADULT TRIAGE NOTE - CHIEF COMPLAINT QUOTE
pt c/o severe right arm pain. pt srtates she was advised to go to ED by Dr underwood as pt had an MI with cardiac cath one week ago. pt is a poor hisoorian who was dropped off by  who immediately left and did not come into ED. pt deneis chest pain  SOb diizzienss and weakness.

## 2020-08-17 NOTE — H&P ADULT - NSHPREVIEWOFSYSTEMS_GEN_ALL_CORE
REVIEW OF SYSTEMS:    CONSTITUTIONAL: No weakness, fevers or chills  EYES/ENT: No visual changes;  No vertigo or throat pain   NECK: No pain or stiffness  RESPIRATORY: No cough, wheezing, hemoptysis; No shortness of breath  CARDIOVASCULAR: No chest pain or palpitations  GASTROINTESTINAL: No abdominal or epigastric pain. No nausea, vomiting, or hematemesis; No diarrhea or constipation. No melena or hematochezia.  GENITOURINARY: No dysuria, frequency or hematuria  NEUROLOGICAL: No numbness or weakness  SKIN: No itching, burning, rashes, or lesions ; swollen right wrist  All other review of systems is negative unless indicated above

## 2020-08-18 ENCOUNTER — TRANSCRIPTION ENCOUNTER (OUTPATIENT)
Age: 80
End: 2020-08-18

## 2020-08-18 ENCOUNTER — APPOINTMENT (OUTPATIENT)
Dept: CARDIOLOGY | Facility: CLINIC | Age: 80
End: 2020-08-18

## 2020-08-18 VITALS
HEART RATE: 65 BPM | DIASTOLIC BLOOD PRESSURE: 73 MMHG | SYSTOLIC BLOOD PRESSURE: 128 MMHG | TEMPERATURE: 98 F | OXYGEN SATURATION: 99 % | RESPIRATION RATE: 17 BRPM

## 2020-08-18 LAB
CULTURE RESULTS: SIGNIFICANT CHANGE UP
HCT VFR BLD CALC: 35.7 % — SIGNIFICANT CHANGE UP (ref 34.5–45)
HGB BLD-MCNC: 11.3 G/DL — LOW (ref 11.5–15.5)
MCHC RBC-ENTMCNC: 28.8 PG — SIGNIFICANT CHANGE UP (ref 27–34)
MCHC RBC-ENTMCNC: 31.7 GM/DL — LOW (ref 32–36)
MCV RBC AUTO: 90.8 FL — SIGNIFICANT CHANGE UP (ref 80–100)
PLATELET # BLD AUTO: 508 K/UL — HIGH (ref 150–400)
RBC # BLD: 3.93 M/UL — SIGNIFICANT CHANGE UP (ref 3.8–5.2)
RBC # FLD: 14.4 % — SIGNIFICANT CHANGE UP (ref 10.3–14.5)
SARS-COV-2 IGG SERPL QL IA: NEGATIVE — SIGNIFICANT CHANGE UP
SARS-COV-2 IGM SERPL IA-ACNC: 0.02 INDEX — SIGNIFICANT CHANGE UP
SARS-COV-2 RNA SPEC QL NAA+PROBE: SIGNIFICANT CHANGE UP
SPECIMEN SOURCE: SIGNIFICANT CHANGE UP
WBC # BLD: 6.03 K/UL — SIGNIFICANT CHANGE UP (ref 3.8–10.5)
WBC # FLD AUTO: 6.03 K/UL — SIGNIFICANT CHANGE UP (ref 3.8–10.5)

## 2020-08-18 PROCEDURE — 99217: CPT

## 2020-08-18 RX ORDER — ACETAMINOPHEN 500 MG
2 TABLET ORAL
Qty: 0 | Refills: 0 | DISCHARGE
Start: 2020-08-18

## 2020-08-18 RX ADMIN — CEFTRIAXONE 1000 MILLIGRAM(S): 500 INJECTION, POWDER, FOR SOLUTION INTRAMUSCULAR; INTRAVENOUS at 09:48

## 2020-08-18 RX ADMIN — LOSARTAN POTASSIUM 25 MILLIGRAM(S): 100 TABLET, FILM COATED ORAL at 09:49

## 2020-08-18 RX ADMIN — ENOXAPARIN SODIUM 40 MILLIGRAM(S): 100 INJECTION SUBCUTANEOUS at 09:48

## 2020-08-18 RX ADMIN — Medication 12.5 MILLIGRAM(S): at 09:50

## 2020-08-18 RX ADMIN — Medication 40 MILLIGRAM(S): at 12:52

## 2020-08-18 RX ADMIN — CLOPIDOGREL BISULFATE 75 MILLIGRAM(S): 75 TABLET, FILM COATED ORAL at 09:48

## 2020-08-18 RX ADMIN — Medication 81 MILLIGRAM(S): at 09:48

## 2020-08-18 NOTE — DISCHARGE NOTE PROVIDER - HOSPITAL COURSE
CC: wrist pain    History of Present Illness:     79 yo F with pmh CAD, recent NSTEMI s/p thrombectomy, IABP and mid rca stent, ?dementia, htn, hyperlipidemia p/w c/o right wrist pain x 1 day. States she woke up with tender swollen right wrist with limited rom.  Denies striking arm/wrist, no bites or stings. Patient seems like a somewhat unreliable historian and forgetful during exam. OF note patient on donepezil. No family at bedside. Denies fevers or chills. Has subacute puncture wouunds and bruising to right wrist which were likely from her recent admission for NSTEMI. Access for LHC was via the groin and not via radial approach. She is non toxic appearing. Denies use of abx.         Hospital course:  Pt evaluated by orthopedic services.  Xray of hand shows soft tissue swelling with out any deep involvement.  Per ortho, diagnosed with probable pseudogout.  She was given dose of IV solumedrol.  I discussed with pt that symptoms likely related to inflammation due to pseudogout and treatment would be anti-inflammatory such as steroids.  Pt agrees with plan of care.  She is feeling better overall and ready for discharge to complete short course of prednisone.  She was initially given 2 days of IV antibiotics due to concern for cellulitis, which has been ruled out.        REVIEW OF SYSTEMS: All other review of systems is negative unless indicated above.        Vital Signs Last 24 Hrs    T(C): 36.4 (18 Aug 2020 09:48), Max: 36.8 (17 Aug 2020 16:06)    T(F): 97.6 (18 Aug 2020 09:48), Max: 98.2 (17 Aug 2020 16:06)    HR: 65 (18 Aug 2020 09:48) (59 - 65)    BP: 128/73 (18 Aug 2020 09:48) (110/70 - 128/73)    BP(mean): 82 (17 Aug 2020 16:06) (82 - 82)    RR: 17 (18 Aug 2020 09:48) (16 - 18)    SpO2: 99% (18 Aug 2020 09:48) (96% - 100%)        PHYSICAL EXAM:        Constitutional: NAD, awake and alert, well-developed    HEENT: PERR, EOMI, Normal Hearing, MMM    Neck: Soft and supple    Respiratory: Breath sounds are clear bilaterally, No wheezing, rales or rhonchi    Cardiovascular: S1 and S2, regular rate and rhythm, no Murmurs, gallops or rubs    Gastrointestinal: Bowel Sounds present, soft, nontender, nondistended, no guarding, no rebound    Extremities: No peripheral edema, left wrist edema, erythema, tenderness    Neurological: A/O x 3, no focal deficits in my limited exam        med/labs: Reviewed and interpreted         Assessment and Plan:     Assessment:    · Assessment        A: Right wrist pain: Probable pseudogout    -Xray noted with 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    -needs outpatient pcp f/u within 1 week    -Pain control/cold compresses    Ortho consult appreciated, treat as pseudogout with steroid trial.         Attending Statement: 40 minutes spent on total encounter and discharge planning.

## 2020-08-18 NOTE — DISCHARGE NOTE NURSING/CASE MANAGEMENT/SOCIAL WORK - PATIENT PORTAL LINK FT
You can access the FollowMyHealth Patient Portal offered by [facility] by registering at the following website: [email or web address]. By joining [employer]’s FollowMyHealth portal, you will also be able to view your health information using other applications (apps) compatible with our system.

## 2020-08-18 NOTE — CONSULT NOTE ADULT - SUBJECTIVE AND OBJECTIVE BOX
FULL CONSULT DICTATED - SEE FOR DETAILS    Pt admitted with 1 day (since last night) history of right wrist pain, swelling and presumptive diagnosis of cellulitis for IV antibiotics. Reports improvement since morning.    Right wrist - slight erythema, warmth, slight decreased ROM, mild tenderness    Xrays - calcification TFCC c/w pseudogout    A/P - likely pseudogout Right wrist  - elevation  - warm compresses  - steroids if medically allowable  -f/u as outpatient    Leonardo Medina MD [facility]  [phone]
Patient is a 80y old  Female who presents with a chief complaint of wrist pain (18 Aug 2020 12:50)    HPI:  81 yo F with pmh CAD, recent NSTEMI s/p thrombectomy, IABP and mid rca stent, ?dementia, htn, hyperlipidemia admitted on  for evaluation of one day history of right wrist pain and swelling; no trauma, bug or tick bites; the patient was recently in hospital for cardiac issues. Denies fever or chills or any other complaints.       PMH: as above  PSH: as above  Meds: per reconciliation sheet, noted below  MEDICATIONS  (STANDING):  aspirin  chewable 81 milliGRAM(s) Oral daily  atorvastatin 40 milliGRAM(s) Oral at bedtime  cefTRIAXone Injectable. 1000 milliGRAM(s) IV Push every 24 hours  clopidogrel Tablet 75 milliGRAM(s) Oral daily  donepezil 10 milliGRAM(s) Oral at bedtime  enoxaparin Injectable 40 milliGRAM(s) SubCutaneous daily  hydrochlorothiazide 12.5 milliGRAM(s) Oral daily  losartan 25 milliGRAM(s) Oral daily    MEDICATIONS  (PRN):  acetaminophen   Tablet .. 650 milliGRAM(s) Oral every 4 hours PRN Mild Pain (1 - 3)  ondansetron Injectable 4 milliGRAM(s) IV Push every 6 hours PRN Nausea  oxycodone    5 mG/acetaminophen 325 mG 1 Tablet(s) Oral every 6 hours PRN Moderate Pain (4 - 6)  senna 2 Tablet(s) Oral at bedtime PRN Constipation  zaleplon 5 milliGRAM(s) Oral at bedtime PRN Insomnia    Allergies    No Known Allergies    Intolerances      Social: no smoking, no alcohol, no illegal drugs; no recent travel, no exposure to TB  FAMILY HISTORY:     no history of premature cardiovascular disease in first degree relatives  ROS: the patient denies fever, no chills, no HA, no dizziness, no sore throat, no blurry vision, no CP, no palpitations, no SOB, no cough, no abdominal pain, no diarrhea, no N/V, no dysuria, no leg pain, no claudication, no rash,  no rectal pain or bleeding, no night sweats  All other systems reviewed and are negative    Vital Signs Last 24 Hrs  T(C): 36.4 (18 Aug 2020 09:48), Max: 36.8 (17 Aug 2020 16:06)  T(F): 97.6 (18 Aug 2020 09:48), Max: 98.2 (17 Aug 2020 16:06)  HR: 65 (18 Aug 2020 09:48) (59 - 65)  BP: 128/73 (18 Aug 2020 09:48) (110/70 - 128/73)  BP(mean): 82 (17 Aug 2020 16:06) (82 - 82)  RR: 17 (18 Aug 2020 09:48) (16 - 18)  SpO2: 99% (18 Aug 2020 09:48) (96% - 100%)  Daily     Daily     PE:    Constitutional: frail looking  HEENT: NC/AT, EOMI, PERRLA, conjunctivae clear; ears and nose atraumatic; pharynx clear  Neck: supple; thyroid not palpable  Back: no tenderness  Respiratory: respiratory effort normal; clear to auscultation  Cardiovascular: S1S2 regular, no murmurs  Abdomen: soft, not tender, not distended, positive BS; no liver or spleen organomegaly  Genitourinary: no suprapubic tenderness  Musculoskeletal: no muscle tenderness, mild erythema over right wrist  Neurological/ Psychiatric: AxOx3, judgement and insight normal;  moving all extremities  Skin: no rashes; no palpable lesions    Labs: all available labs reviewed                        11.3   6.03  )-----------( 508      ( 18 Aug 2020 07:37 )             35.7     08-17    142  |  108  |  12  ----------------------------<  84  3.7   |  29  |  0.62    Ca    8.8      17 Aug 2020 10:54    TPro  7.3  /  Alb  3.2<L>  /  TBili  0.5  /  DBili  x   /  AST  19  /  ALT  38  /  AlkPhos  84  08-17     LIVER FUNCTIONS - ( 17 Aug 2020 10:54 )  Alb: 3.2 g/dL / Pro: 7.3 gm/dL / ALK PHOS: 84 U/L / ALT: 38 U/L / AST: 19 U/L / GGT: x           Urinalysis Basic - ( 17 Aug 2020 12:14 )    Color: Yellow / Appearance: Clear / S.005 / pH: x  Gluc: x / Ketone: Negative  / Bili: Negative / Urobili: Negative mg/dL   Blood: x / Protein: Negative mg/dL / Nitrite: Negative   Leuk Esterase: Negative / RBC: x / WBC x   Sq Epi: x / Non Sq Epi: x / Bacteria: x    < from: Xray Wrist 3 Views, Right (20 @ 12:20) >    EXAM:  XR WRIST COMP MIN 3 VIEWS RT                            PROCEDURE DATE:  2020          INTERPRETATION:  Radiographs of the wrist    CLINICAL INFORMATION: Pain.    TECHNIQUE:  Frontal, oblique and lateral views of the wrist were obtained.    FINDINGS:   No prior examinations are available for review.    The osseous structures of the wrist show degenerative changes with narrowing at the radiocarpal articulation and first carpal metacarpal and carpal carpal joints. There is a. Small erosion at the distal scaphoid. There is soft tissue calcification of the TFCC as well as scattered ligaments. FINDINGS suspicious for CPPD. Moderate soft tissue swelling is noted.    No radiopaque foreign body is seen.    IMPRESSION:   degenerative changes with narrowing at the radiocarpal articulation and first carpal metacarpal and carpal carpal joints. There is a. Small erosion at the distal scaphoid. There is soft tissue calcification of the TFCC as well as scattered ligaments. FINDINGS suspicious for CPPD.   Moderate soft tissue swelling is noted.      < end of copied text >          Radiology: all available radiological tests reviewed    Advanced directives addressed: full resuscitation

## 2020-08-18 NOTE — DISCHARGE NOTE PROVIDER - NSDCMRMEDTOKEN_GEN_ALL_CORE_FT
acetaminophen 325 mg oral tablet: 2 tab(s) orally every 4 hours, As needed, Mild Pain (1 - 3)  aspirin 81 mg oral tablet, chewable: 1 tab(s) orally once a day  clopidogrel 75 mg oral tablet: 1 tab(s) orally once a day  donepezil 10 mg oral tablet: 1 tab(s) orally once a day (at bedtime)  pravastatin 40 mg oral tablet: 1 tab(s) orally once a day  predniSONE 10 mg oral tablet: 3 tab(s) orally once a day   telmisartan-hydrochlorothiazide 80mg-12.5mg oral tablet: 1 tab(s) orally once a day

## 2020-08-18 NOTE — CONSULT NOTE ADULT - ASSESSMENT
81 yo F with pmh CAD, recent NSTEMI s/p thrombectomy, IABP and mid rca stent, ?dementia, htn, hyperlipidemia admitted on 8/17 for evaluation of one day history of right wrist pain and swelling; no trauma, bug or tick bites; the patient was recently in hospital for cardiac issues. Denies fever or chills or any other complaints.     1. Patient admitted with right wrist pain, most likely gout versus pseudogout, doubt cellulitis; patient denies having any iv sites in right hand or upper extremity on prior hospitalization  - agree with discharging patient home on short course of steroids  - would continue to observe off antibiotics  2. other issues: per medicine

## 2020-08-18 NOTE — DISCHARGE NOTE PROVIDER - NSDCCPCAREPLAN_GEN_ALL_CORE_FT
PRINCIPAL DISCHARGE DIAGNOSIS  Diagnosis: Pseudogout of wrist, right  Assessment and Plan of Treatment: Complete steroid course (Starting tomorrow). script sent to pharmacy.  Rest, elevation, ice compression.   follow up with pcp within 3-5 days.

## 2020-08-19 DIAGNOSIS — I49.3 VENTRICULAR PREMATURE DEPOLARIZATION: ICD-10-CM

## 2020-08-19 DIAGNOSIS — I25.10 ATHEROSCLEROTIC HEART DISEASE OF NATIVE CORONARY ARTERY WITHOUT ANGINA PECTORIS: ICD-10-CM

## 2020-08-19 DIAGNOSIS — F03.90 UNSPECIFIED DEMENTIA WITHOUT BEHAVIORAL DISTURBANCE: ICD-10-CM

## 2020-08-19 DIAGNOSIS — R00.1 BRADYCARDIA, UNSPECIFIED: ICD-10-CM

## 2020-08-19 DIAGNOSIS — R57.0 CARDIOGENIC SHOCK: ICD-10-CM

## 2020-08-19 DIAGNOSIS — E87.0 HYPEROSMOLALITY AND HYPERNATREMIA: ICD-10-CM

## 2020-08-19 DIAGNOSIS — I47.2 VENTRICULAR TACHYCARDIA: ICD-10-CM

## 2020-08-19 DIAGNOSIS — I95.9 HYPOTENSION, UNSPECIFIED: ICD-10-CM

## 2020-08-19 DIAGNOSIS — E78.5 HYPERLIPIDEMIA, UNSPECIFIED: ICD-10-CM

## 2020-08-19 DIAGNOSIS — I21.4 NON-ST ELEVATION (NSTEMI) MYOCARDIAL INFARCTION: ICD-10-CM

## 2020-08-19 DIAGNOSIS — M20.61 ACQUIRED DEFORMITIES OF TOE(S), UNSPECIFIED, RIGHT FOOT: ICD-10-CM

## 2020-08-22 LAB
CULTURE RESULTS: SIGNIFICANT CHANGE UP
CULTURE RESULTS: SIGNIFICANT CHANGE UP
SPECIMEN SOURCE: SIGNIFICANT CHANGE UP
SPECIMEN SOURCE: SIGNIFICANT CHANGE UP

## 2020-08-28 ENCOUNTER — NON-APPOINTMENT (OUTPATIENT)
Age: 80
End: 2020-08-28

## 2020-09-08 ENCOUNTER — APPOINTMENT (OUTPATIENT)
Dept: CARDIOLOGY | Facility: CLINIC | Age: 80
End: 2020-09-08

## 2020-09-08 ENCOUNTER — APPOINTMENT (OUTPATIENT)
Dept: CARDIOLOGY | Facility: CLINIC | Age: 80
End: 2020-09-08
Payer: MEDICARE

## 2020-09-16 ENCOUNTER — NON-APPOINTMENT (OUTPATIENT)
Age: 80
End: 2020-09-16

## 2020-09-16 ENCOUNTER — APPOINTMENT (OUTPATIENT)
Dept: CARDIOLOGY | Facility: CLINIC | Age: 80
End: 2020-09-16
Payer: MEDICARE

## 2020-09-16 VITALS
HEIGHT: 64 IN | DIASTOLIC BLOOD PRESSURE: 65 MMHG | BODY MASS INDEX: 16.73 KG/M2 | SYSTOLIC BLOOD PRESSURE: 94 MMHG | HEART RATE: 52 BPM | OXYGEN SATURATION: 100 % | WEIGHT: 98 LBS

## 2020-09-16 DIAGNOSIS — Z86.59 PERSONAL HISTORY OF OTHER MENTAL AND BEHAVIORAL DISORDERS: ICD-10-CM

## 2020-09-16 DIAGNOSIS — Z78.9 OTHER SPECIFIED HEALTH STATUS: ICD-10-CM

## 2020-09-16 PROCEDURE — 99215 OFFICE O/P EST HI 40 MIN: CPT

## 2020-09-16 PROCEDURE — 93000 ELECTROCARDIOGRAM COMPLETE: CPT

## 2020-09-16 RX ORDER — PREDNISONE 10 MG/1
10 TABLET ORAL DAILY
Qty: 10 | Refills: 0 | Status: COMPLETED | COMMUNITY

## 2020-09-16 NOTE — HISTORY OF PRESENT ILLNESS
[FreeTextEntry1] : Returns for followup after hospitalization for non-ST elevation MI which was complicated by cardiogenic shock. She regionally presented with syncope and had been having chest pain for weeks prior. By underwent the emergent angiography and percutaneous intervention in the right coronary artery and required pinning to reorder balloon pump for short period of time to his procedure. She subsequently recovered and has done well and returns now for followup. She denies chest pain, shortness of breath, dyspnea on exertion, dizziness, or lightheadedness. She does have some difficulties with her memory, but this has been baseline for her for some time now, and she has been following with Dr. De Santiago regarding this.

## 2020-09-16 NOTE — DISCUSSION/SUMMARY
[FreeTextEntry1] : NSTEMI/Inferior and RV infarct/CAD: recovering nicely and tolerating medication well. Continue dual antiplatelet therapy.\par HL: Blood work prior to follow up to evaluate response to pravastatin. \par Carotid screening\par Discussed cardiac rehab\par F/u in 2 months.

## 2020-09-16 NOTE — PHYSICAL EXAM
[General Appearance - Well Developed] : well developed [General Appearance - Well Nourished] : well nourished [Normal Conjunctiva] : the conjunctiva exhibited no abnormalities [Normal Oral Mucosa] : normal oral mucosa [No Oral Pallor] : no oral pallor [Normal Jugular Venous A Waves Present] : normal jugular venous A waves present [Normal Jugular Venous V Waves Present] : normal jugular venous V waves present [No Jugular Venous Metz A Waves] : no jugular venous metz A waves [5th Left ICS - MCL] : palpated at the 5th LICS in the midclavicular line [Auscultation Breath Sounds / Voice Sounds] : lungs were clear to auscultation bilaterally [Normal] : normal [Rhythm Regular] : regular [Normal S1] : normal S1 [Normal S2] : normal S2 [No Murmur] : no murmurs heard [Left Carotid Bruit] : no bruit heard over the left carotid [Right Carotid Bruit] : no bruit heard over the right carotid [2+] : right 2+ [No Pitting Edema] : no pitting edema present [Abdomen Soft] : soft [Bowel Sounds] : normal bowel sounds [Nail Clubbing] : no clubbing of the fingernails [Abnormal Walk] : normal gait [] : no rash [Cyanosis, Localized] : no localized cyanosis [No Anxiety] : not feeling anxious

## 2020-10-21 NOTE — ED ADULT NURSE NOTE - PAIN: PRESENCE, MLM
What Type Of Note Output Would You Prefer (Optional)?: Standard Output How Severe Are Your Spot(S)?: mild Have Your Spot(S) Been Treated In The Past?: has not been treated Hpi Title: Evaluation of Skin Lesions Family Member: Father to R wrist/hand/complains of pain/discomfort

## 2020-12-29 ENCOUNTER — APPOINTMENT (OUTPATIENT)
Dept: CARDIOLOGY | Facility: CLINIC | Age: 80
End: 2020-12-29

## 2021-01-29 ENCOUNTER — APPOINTMENT (OUTPATIENT)
Dept: CARDIOLOGY | Facility: CLINIC | Age: 81
End: 2021-01-29
Payer: MEDICARE

## 2021-01-29 ENCOUNTER — NON-APPOINTMENT (OUTPATIENT)
Age: 81
End: 2021-01-29

## 2021-01-29 VITALS
HEIGHT: 64 IN | OXYGEN SATURATION: 99 % | TEMPERATURE: 97.6 F | WEIGHT: 102 LBS | DIASTOLIC BLOOD PRESSURE: 73 MMHG | SYSTOLIC BLOOD PRESSURE: 130 MMHG | HEART RATE: 55 BPM | BODY MASS INDEX: 17.42 KG/M2

## 2021-01-29 DIAGNOSIS — I25.2 OLD MYOCARDIAL INFARCTION: ICD-10-CM

## 2021-01-29 DIAGNOSIS — I21.19 ST ELEVATION (STEMI) MYOCARDIAL INFARCTION INVOLVING OTHER CORONARY ARTERY OF INFERIOR WALL: ICD-10-CM

## 2021-01-29 DIAGNOSIS — R57.0 CARDIOGENIC SHOCK: ICD-10-CM

## 2021-01-29 DIAGNOSIS — I25.10 ATHEROSCLEROTIC HEART DISEASE OF NATIVE CORONARY ARTERY W/OUT ANGINA PECTORIS: ICD-10-CM

## 2021-01-29 DIAGNOSIS — I10 ESSENTIAL (PRIMARY) HYPERTENSION: ICD-10-CM

## 2021-01-29 DIAGNOSIS — Z95.5 PRESENCE OF CORONARY ANGIOPLASTY IMPLANT AND GRAFT: ICD-10-CM

## 2021-01-29 PROCEDURE — 99214 OFFICE O/P EST MOD 30 MIN: CPT

## 2021-01-29 PROCEDURE — 93000 ELECTROCARDIOGRAM COMPLETE: CPT

## 2021-01-29 RX ORDER — CLOPIDOGREL BISULFATE 75 MG/1
75 TABLET, FILM COATED ORAL DAILY
Qty: 90 | Refills: 3 | Status: ACTIVE | COMMUNITY
Start: 1900-01-01 | End: 1900-01-01

## 2021-01-29 RX ORDER — POTASSIUM CHLORIDE 750 MG/1
10 TABLET, FILM COATED, EXTENDED RELEASE ORAL
Qty: 90 | Refills: 3 | Status: ACTIVE | COMMUNITY
Start: 2020-09-16 | End: 1900-01-01

## 2021-01-29 RX ORDER — PRAVASTATIN SODIUM 40 MG/1
40 TABLET ORAL
Qty: 90 | Refills: 3 | Status: ACTIVE | COMMUNITY
Start: 1900-01-01 | End: 1900-01-01

## 2021-01-29 RX ORDER — TELMISARTAN AND HYDROCHLOROTHIAZIDE 80; 12.5 MG/1; MG/1
80-12.5 TABLET ORAL DAILY
Qty: 90 | Refills: 3 | Status: ACTIVE | COMMUNITY
Start: 1900-01-01 | End: 1900-01-01

## 2021-01-29 RX ORDER — SERTRALINE HYDROCHLORIDE 50 MG/1
50 TABLET, FILM COATED ORAL
Refills: 0 | Status: ACTIVE | COMMUNITY

## 2021-01-29 NOTE — PHYSICAL EXAM
[General Appearance - Well Developed] : well developed [General Appearance - Well Nourished] : well nourished [Normal Conjunctiva] : the conjunctiva exhibited no abnormalities [Normal Oral Mucosa] : normal oral mucosa [No Oral Pallor] : no oral pallor [Normal Jugular Venous V Waves Present] : normal jugular venous V waves present [Auscultation Breath Sounds / Voice Sounds] : lungs were clear to auscultation bilaterally [Bowel Sounds] : normal bowel sounds [Abdomen Soft] : soft [Abnormal Walk] : normal gait [Nail Clubbing] : no clubbing of the fingernails [Cyanosis, Localized] : no localized cyanosis [No Anxiety] : not feeling anxious [] : no rash [5th Left ICS - MCL] : palpated at the 5th LICS in the midclavicular line [Normal] : normal [Rhythm Regular] : regular [Normal S1] : normal S1 [Normal S2] : normal S2 [No Murmur] : no murmurs heard [2+] : left 2+ [No Pitting Edema] : no pitting edema present [Right Carotid Bruit] : no bruit heard over the right carotid [Left Carotid Bruit] : no bruit heard over the left carotid

## 2021-01-29 NOTE — HISTORY OF PRESENT ILLNESS
[FreeTextEntry1] : Feels well.  Denies chest pain, SOB, dizziness, lightheadedness, or syncope. Tolerating medications without issues. Reviewed labs from 9/2021 and results good.

## 2021-01-29 NOTE — DISCUSSION/SUMMARY
[FreeTextEntry1] : NSTEMI/Inferior and RV infarct/CAD: recovering nicely and tolerating medication well. Continue dual antiplatelet therapy. Exerting herself without issues.  Memory issues still ongoing.\par HL: Good response to pravastatin. Continue current therapy. Repeat labs when she sees primary\par HTN: controlled on current regime.  Low sodium diet discussed.\par F/u in 4-5 months.

## 2021-02-02 ENCOUNTER — APPOINTMENT (OUTPATIENT)
Dept: CARDIOLOGY | Facility: CLINIC | Age: 81
End: 2021-02-02

## 2021-02-06 ENCOUNTER — APPOINTMENT (OUTPATIENT)
Dept: ULTRASOUND IMAGING | Facility: CLINIC | Age: 81
End: 2021-02-06
Payer: MEDICARE

## 2021-02-06 ENCOUNTER — OUTPATIENT (OUTPATIENT)
Dept: OUTPATIENT SERVICES | Facility: HOSPITAL | Age: 81
LOS: 1 days | End: 2021-02-06
Payer: MEDICARE

## 2021-02-06 ENCOUNTER — RESULT REVIEW (OUTPATIENT)
Age: 81
End: 2021-02-06

## 2021-02-06 DIAGNOSIS — Z00.8 ENCOUNTER FOR OTHER GENERAL EXAMINATION: ICD-10-CM

## 2021-02-06 PROCEDURE — 93880 EXTRACRANIAL BILAT STUDY: CPT | Mod: 26

## 2021-02-06 PROCEDURE — 93880 EXTRACRANIAL BILAT STUDY: CPT

## 2021-02-11 DIAGNOSIS — I25.10 ATHEROSCLEROTIC HEART DISEASE OF NATIVE CORONARY ARTERY WITHOUT ANGINA PECTORIS: ICD-10-CM

## 2021-02-11 DIAGNOSIS — I65.29 OCCLUSION AND STENOSIS OF UNSPECIFIED CAROTID ARTERY: ICD-10-CM

## 2021-10-20 ENCOUNTER — RX RENEWAL (OUTPATIENT)
Age: 81
End: 2021-10-20

## 2021-12-07 ENCOUNTER — EMERGENCY (EMERGENCY)
Facility: HOSPITAL | Age: 81
LOS: 0 days | Discharge: HOME | End: 2021-12-07
Attending: EMERGENCY MEDICINE
Payer: MEDICARE

## 2021-12-07 VITALS
OXYGEN SATURATION: 100 % | DIASTOLIC BLOOD PRESSURE: 79 MMHG | TEMPERATURE: 98 F | SYSTOLIC BLOOD PRESSURE: 117 MMHG | RESPIRATION RATE: 18 BRPM | HEART RATE: 54 BPM

## 2021-12-07 VITALS — WEIGHT: 134.92 LBS | HEIGHT: 66 IN

## 2021-12-07 DIAGNOSIS — I10 ESSENTIAL (PRIMARY) HYPERTENSION: ICD-10-CM
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
## 2021-12-07 DIAGNOSIS — I25.10 ATHEROSCLEROTIC HEART DISEASE OF NATIVE CORONARY ARTERY WITHOUT ANGINA PECTORIS: ICD-10-CM

## 2021-12-07 DIAGNOSIS — S09.90XA UNSPECIFIED INJURY OF HEAD, INITIAL ENCOUNTER: ICD-10-CM

## 2021-12-07 DIAGNOSIS — F03.90 UNSPECIFIED DEMENTIA WITHOUT BEHAVIORAL DISTURBANCE: ICD-10-CM

## 2021-12-07 DIAGNOSIS — E78.5 HYPERLIPIDEMIA, UNSPECIFIED: ICD-10-CM

## 2021-12-07 DIAGNOSIS — Z79.01 LONG TERM (CURRENT) USE OF ANTICOAGULANTS: ICD-10-CM

## 2021-12-07 DIAGNOSIS — Z79.82 LONG TERM (CURRENT) USE OF ASPIRIN: ICD-10-CM
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
## 2021-12-07 PROCEDURE — G1004: CPT

## 2021-12-07 PROCEDURE — 70450 CT HEAD/BRAIN W/O DYE: CPT | Mod: ME

## 2021-12-07 PROCEDURE — 99284 EMERGENCY DEPT VISIT MOD MDM: CPT

## 2021-12-07 PROCEDURE — 99284 EMERGENCY DEPT VISIT MOD MDM: CPT | Mod: 25

## 2021-12-07 PROCEDURE — 70450 CT HEAD/BRAIN W/O DYE: CPT | Mod: 26,ME

## 2021-12-07 NOTE — ED PROVIDER NOTE - PATIENT PORTAL LINK FT
You can access the FollowMyHealth Patient Portal offered by [facility] by registering at the following website: [email or web address]. By joining [employer]’s FollowMyHealth portal, you will also be able to view your health information using other applications (apps) compatible with our system.

## 2021-12-07 NOTE — ED PROVIDER NOTE - OBJECTIVE STATEMENT
82 y/o F with PMh of dementia, HLD, HTN, CAD on plavix and ASA, accompanied by aide presents with head trauma today. Both patient and aide were unsure of specific AC patient is taking, noted to be on ASA and plavix from prior documentation. As per aide, she left patient in a room to go get something. Upon returning a few minutes later, she found patient on the floor. No reports of LOC. Pt states she has been walking since fall, denies visual changes, numbness/tingling in extremities.

## 2021-12-07 NOTE — ED ADULT TRIAGE NOTE - CHIEF COMPLAINT QUOTE
pt presents to Ed with complaints of fall today. pt on blood thinner but known which one. pt had head strike per home health aide. neuro alert initiated by Leon Alarcon.    pt's aide Ivette can be reached at [phone].

## 2021-12-07 NOTE — ED ADULT NURSE NOTE - CHIEF COMPLAINT QUOTE
pt presents to Ed with complaints of fall today. pt on blood thinner but known which one. pt had head strike per home health aide. neuro alert initiated by Leon Alarcon.    pt's aide Ivette can be reached at [phone].

## 2021-12-07 NOTE — ED ADULT NURSE REASSESSMENT NOTE - NS ED NURSE REASSESS COMMENT FT1
care assumed from DOTTY Hernandes. chart reviewed, no distress noted, VS as charted. Safety maintained.

## 2021-12-07 NOTE — ED PROVIDER NOTE - CLINICAL SUMMARY MEDICAL DECISION MAKING FREE TEXT BOX
CT head negative.  Scalp hematoma on exam.  No other trauma.  Patient appears welll, A&O x 4.  D/c home in good condition.  Return precautions given.

## 2021-12-07 NOTE — ED PROVIDER NOTE - NS ED ROS FT
GEN: Denies fever, chills, sick contacts, recent travel  HEENT: Denies dizziness, blurry vision, HA  Cardiac: Denies CP  Lungs: Denies cough  PV: Denies LE swelling  GI: Denies nausea, vomiting  : Denies hematuria  Skin: Denies rash, redness, open wound  MSK: Denies pain, decreased ROM  Neuro: Denies dizziness, difficulty speaking, difficulty swallowing, numbness/tingling in extremities, loss of bowel/bladder control, weakness in extremities

## 2021-12-07 NOTE — ED ADULT NURSE NOTE - INTERVENTIONS DEFINITIONS
Stretcher in lowest position, wheels locked, appropriate side rails in place/Provide visual cue, wrist band, yellow gown, etc./Monitor gait and stability/Provide visual clues: red socks

## 2021-12-07 NOTE — ED PROVIDER NOTE - PHYSICAL EXAMINATION
Gen: Well appearing. awake and alert  HEENT: +hematoma over left parietal scalp. Dentition in good repair. No oropharyngeal erythema, tonsilar enlargement or exudates. Uvula midline. No submandibular or anterior cervical lymphadenopathy. TM well visualized bilaterally. Nares patent. No midline C-spine tenderness  Cardiac: s1s2, RRR.  Lungs: CTAB, no chest wall tenderness.  Abdomen: NBS x4, soft, nontender. No CVAT.  MSK: No obvious deformity to extremities. No midline spinal tenderness or tenderness over bony landmarks on extremities. 5/5 upper and lower extremity strength. Full ROM in all extremities  Neuro: CN II-XII intact. Sensation intact to light touch in all extremities. 5/5 strength in all extremities.   PV: No LE edema or calf tenderness. Distal pulses 2+ in all extremities. Gen: Well appearing. awake and alert  HEENT: +hematoma over left parietal scalp. Dentition in good repair. No oropharyngeal erythema, tonsilar enlargement or exudates. Uvula midline. No submandibular or anterior cervical lymphadenopathy. TM well visualized bilaterally. Nares patent. No midline C-spine tenderness  Cardiac: s1s2, RRR.  Lungs: CTAB, no chest wall tenderness.  Abdomen: NBS x4, soft, nontender. No CVAT.  MSK: No obvious deformity to extremities. No midline spinal tenderness or tenderness over bony landmarks on extremities. 5/5 upper and lower extremity strength. Full ROM in all extremities  Neuro: CN II-XII intact. Sensation intact to light touch in all extremities. 5/5 strength in all extremities.   PV: No LE edema or calf tenderness. Distal pulses 2+ in all extremities.    Agree with above. Krishna Andrew D.O.

## 2021-12-07 NOTE — ED PROVIDER NOTE - NSFOLLOWUPINSTRUCTIONS_ED_ALL_ED_FT
Head Injury    WHAT YOU NEED TO KNOW:    A head injury can include your scalp, face, skull, or brain and range from mild to severe. Effects can appear immediately after the injury or develop later. The effects may last a short time or be permanent. Healthcare providers may want to check your recovery over time. Treatment may change as you recover or develop new health problems from the head injury.    DISCHARGE INSTRUCTIONS:    Call your local emergency number (911 in the [place]), or have someone else call if:   •You cannot be woken.      •You have a seizure.      •You stop responding to others or you faint.      •You have blurry or double vision.      •Your speech becomes slurred or confused.      •You have arm or leg weakness, loss of feeling, or new problems with coordination.      •Your pupils are larger than usual, or one pupil is a different size than the other.      •You have blood or clear fluid coming out of your ears or nose.      Return to the emergency department if:   •You have repeated or forceful vomiting.      •You feel confused.      •Your headache gets worse or becomes severe.      •You or someone caring for you notices that you are harder to wake than usual.      Call your doctor if:   •Your symptoms last longer than 6 weeks after the injury.      •You have questions or concerns about your condition or care.      Medicines:   •Acetaminophen decreases pain and fever. It is available without a doctor's order. Ask how much to take and how often to take it. Follow directions. Read the labels of all other medicines you are using to see if they also contain acetaminophen, or ask your doctor or pharmacist. Acetaminophen can cause liver damage if not taken correctly. Do not use more than 4 grams (4,000 milligrams) total of acetaminophen in one day.       •Take your medicine as directed. Contact your healthcare provider if you think your medicine is not helping or if you have side effects. Tell him or her if you are allergic to any medicine. Keep a list of the medicines, vitamins, and herbs you take. Include the amounts, and when and why you take them. Bring the list or the pill bottles to follow-up visits. Carry your medicine list with you in case of an emergency.      Self-care:   •Rest or do quiet activities. Limit your time watching TV, using the computer, or doing tasks that require a lot of thinking. Slowly return to your normal activities as directed. Do not play sports or do activities that may cause you to get hit in the head. Ask your healthcare provider when you can return to sports.      •Apply ice on your head for 15 to 20 minutes every hour or as directed. Use an ice pack, or put crushed ice in a plastic bag. Cover it with a towel before you apply it to your skin. Ice helps prevent tissue damage and decreases swelling and pain.      •Have someone stay with you for 24 hours , or as directed. This person can monitor you for problems and call for help if needed. When you are awake, the person should ask you a few questions every few hours to see if you are thinking clearly. An example is to ask your name or address.      Prevent another head injury:   •Wear a helmet that fits properly. Do this when you play sports, or ride a bike, scooter, or skateboard. Helmets help decrease your risk for a serious head injury. Talk to your healthcare provider about other ways you can protect yourself if you play sports.      •Wear your seatbelt every time you are in a car. This helps lower your risk for a head injury if you are in a car accident.      Follow up with your doctor as directed: Write down your questions so you remember to ask them during your visits.       © Copyright [employer] 2021           back to top                          © Copyright [employer] 2021

## 2021-12-07 NOTE — ED ADULT NURSE NOTE - OBJECTIVE STATEMENT
patient states she was walking down two steps, then next thing she knew she was "flying through the air" and fell into the side of her car, striking the left side of her head.  She denies LOC. She takes a baby asa daily.

## 2021-12-07 NOTE — ED ADULT NURSE REASSESSMENT NOTE - NS ED NURSE REASSESS COMMENT FT1
spoke with KRISTA, he will contact his brothers and secure a ride for the patietn.  He will call back with the plan

## 2022-01-18 ENCOUNTER — RX RENEWAL (OUTPATIENT)
Age: 82
End: 2022-01-18

## 2022-03-25 NOTE — ED ADULT TRIAGE NOTE - BANDS:
Clean wound with baby shampoo and water    Apply dakin's moist gauze to wound,cover with dry gauze,wrap with kerlix,paper tape  Change daily and as needed    Elevate feet as much as possible    No standing  
Fall Risk;

## 2023-03-15 ENCOUNTER — EMERGENCY (EMERGENCY)
Facility: HOSPITAL | Age: 83
LOS: 0 days | Discharge: HOME | End: 2023-03-16
Attending: STUDENT IN AN ORGANIZED HEALTH CARE EDUCATION/TRAINING PROGRAM
Payer: MEDICARE

## 2023-03-15 VITALS
TEMPERATURE: 98 F | RESPIRATION RATE: 18 BRPM | OXYGEN SATURATION: 92 % | DIASTOLIC BLOOD PRESSURE: 57 MMHG | HEART RATE: 73 BPM | SYSTOLIC BLOOD PRESSURE: 103 MMHG | HEIGHT: 66 IN

## 2023-03-15 DIAGNOSIS — S70.01XA CONTUSION OF RIGHT HIP, INITIAL ENCOUNTER: ICD-10-CM
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
## 2023-03-15 DIAGNOSIS — F03.90 UNSPECIFIED DEMENTIA, UNSPECIFIED SEVERITY, WITHOUT BEHAVIORAL DISTURBANCE, PSYCHOTIC DISTURBANCE, MOOD DISTURBANCE, AND ANXIETY: ICD-10-CM
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
## 2023-03-15 DIAGNOSIS — Z79.02 LONG TERM (CURRENT) USE OF ANTITHROMBOTICS/ANTIPLATELETS: ICD-10-CM
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
## 2023-03-15 DIAGNOSIS — S70.11XA CONTUSION OF RIGHT THIGH, INITIAL ENCOUNTER: ICD-10-CM

## 2023-03-15 DIAGNOSIS — Z79.82 LONG TERM (CURRENT) USE OF ASPIRIN: ICD-10-CM

## 2023-03-15 DIAGNOSIS — K57.92 DIVERTICULITIS OF INTESTINE, PART UNSPECIFIED, WITHOUT PERFORATION OR ABSCESS WITHOUT BLEEDING: ICD-10-CM

## 2023-03-15 DIAGNOSIS — Z20.822 CONTACT WITH AND (SUSPECTED) EXPOSURE TO COVID-19: ICD-10-CM

## 2023-03-15 LAB
ALBUMIN SERPL ELPH-MCNC: 3.1 G/DL — LOW (ref 3.3–5)
ALP SERPL-CCNC: 80 U/L — SIGNIFICANT CHANGE UP (ref 40–120)
ALT FLD-CCNC: 21 U/L — SIGNIFICANT CHANGE UP (ref 12–78)
ANION GAP SERPL CALC-SCNC: 7 MMOL/L — SIGNIFICANT CHANGE UP (ref 5–17)
APPEARANCE UR: CLEAR — SIGNIFICANT CHANGE UP
AST SERPL-CCNC: 11 U/L — LOW (ref 15–37)
BASOPHILS # BLD AUTO: 0.08 K/UL — SIGNIFICANT CHANGE UP (ref 0–0.2)
BASOPHILS NFR BLD AUTO: 0.7 % — SIGNIFICANT CHANGE UP (ref 0–2)
BILIRUB SERPL-MCNC: 0.4 MG/DL — SIGNIFICANT CHANGE UP (ref 0.2–1.2)
BILIRUB UR-MCNC: NEGATIVE — SIGNIFICANT CHANGE UP
BUN SERPL-MCNC: 25 MG/DL — HIGH (ref 7–23)
CALCIUM SERPL-MCNC: 9 MG/DL — SIGNIFICANT CHANGE UP (ref 8.5–10.1)
CHLORIDE SERPL-SCNC: 111 MMOL/L — HIGH (ref 96–108)
CO2 SERPL-SCNC: 25 MMOL/L — SIGNIFICANT CHANGE UP (ref 22–31)
COLOR SPEC: YELLOW — SIGNIFICANT CHANGE UP
CREAT SERPL-MCNC: 1.26 MG/DL — SIGNIFICANT CHANGE UP (ref 0.5–1.3)
DIFF PNL FLD: ABNORMAL
EGFR: 43 ML/MIN/1.73M2 — LOW
EOSINOPHIL # BLD AUTO: 0.2 K/UL — SIGNIFICANT CHANGE UP (ref 0–0.5)
EOSINOPHIL NFR BLD AUTO: 1.9 % — SIGNIFICANT CHANGE UP (ref 0–6)
FLUAV AG NPH QL: SIGNIFICANT CHANGE UP
FLUBV AG NPH QL: SIGNIFICANT CHANGE UP
GLUCOSE SERPL-MCNC: 144 MG/DL — HIGH (ref 70–99)
GLUCOSE UR QL: NEGATIVE — SIGNIFICANT CHANGE UP
HCT VFR BLD CALC: 40.4 % — SIGNIFICANT CHANGE UP (ref 34.5–45)
HGB BLD-MCNC: 13.3 G/DL — SIGNIFICANT CHANGE UP (ref 11.5–15.5)
IMM GRANULOCYTES NFR BLD AUTO: 0.6 % — SIGNIFICANT CHANGE UP (ref 0–0.9)
KETONES UR-MCNC: NEGATIVE — SIGNIFICANT CHANGE UP
LEUKOCYTE ESTERASE UR-ACNC: ABNORMAL
LIDOCAIN IGE QN: 475 U/L — HIGH (ref 73–393)
LYMPHOCYTES # BLD AUTO: 1.06 K/UL — SIGNIFICANT CHANGE UP (ref 1–3.3)
LYMPHOCYTES # BLD AUTO: 9.9 % — LOW (ref 13–44)
MAGNESIUM SERPL-MCNC: 2 MG/DL — SIGNIFICANT CHANGE UP (ref 1.6–2.6)
MCHC RBC-ENTMCNC: 29.4 PG — SIGNIFICANT CHANGE UP (ref 27–34)
MCHC RBC-ENTMCNC: 32.9 GM/DL — SIGNIFICANT CHANGE UP (ref 32–36)
MCV RBC AUTO: 89.4 FL — SIGNIFICANT CHANGE UP (ref 80–100)
MONOCYTES # BLD AUTO: 0.79 K/UL — SIGNIFICANT CHANGE UP (ref 0–0.9)
MONOCYTES NFR BLD AUTO: 7.3 % — SIGNIFICANT CHANGE UP (ref 2–14)
NEUTROPHILS # BLD AUTO: 8.57 K/UL — HIGH (ref 1.8–7.4)
NEUTROPHILS NFR BLD AUTO: 79.6 % — HIGH (ref 43–77)
NITRITE UR-MCNC: POSITIVE
PH UR: 5 — SIGNIFICANT CHANGE UP (ref 5–8)
PLATELET # BLD AUTO: 552 K/UL — HIGH (ref 150–400)
POTASSIUM SERPL-MCNC: 4 MMOL/L — SIGNIFICANT CHANGE UP (ref 3.5–5.3)
POTASSIUM SERPL-SCNC: 4 MMOL/L — SIGNIFICANT CHANGE UP (ref 3.5–5.3)
PROT SERPL-MCNC: 7.2 GM/DL — SIGNIFICANT CHANGE UP (ref 6–8.3)
PROT UR-MCNC: NEGATIVE — SIGNIFICANT CHANGE UP
RBC # BLD: 4.52 M/UL — SIGNIFICANT CHANGE UP (ref 3.8–5.2)
RBC # FLD: 15.2 % — HIGH (ref 10.3–14.5)
RSV RNA NPH QL NAA+NON-PROBE: SIGNIFICANT CHANGE UP
SARS-COV-2 RNA SPEC QL NAA+PROBE: SIGNIFICANT CHANGE UP
SODIUM SERPL-SCNC: 143 MMOL/L — SIGNIFICANT CHANGE UP (ref 135–145)
SP GR SPEC: 1.01 — SIGNIFICANT CHANGE UP (ref 1.01–1.02)
TROPONIN I, HIGH SENSITIVITY RESULT: 3.58 NG/L — SIGNIFICANT CHANGE UP
UROBILINOGEN FLD QL: NEGATIVE — SIGNIFICANT CHANGE UP
WBC # BLD: 10.76 K/UL — HIGH (ref 3.8–10.5)
WBC # FLD AUTO: 10.76 K/UL — HIGH (ref 3.8–10.5)

## 2023-03-15 PROCEDURE — 87086 URINE CULTURE/COLONY COUNT: CPT

## 2023-03-15 PROCEDURE — 99285 EMERGENCY DEPT VISIT HI MDM: CPT | Mod: 25

## 2023-03-15 PROCEDURE — 99285 EMERGENCY DEPT VISIT HI MDM: CPT

## 2023-03-15 PROCEDURE — 85025 COMPLETE CBC W/AUTO DIFF WBC: CPT

## 2023-03-15 PROCEDURE — 84484 ASSAY OF TROPONIN QUANT: CPT

## 2023-03-15 PROCEDURE — 73502 X-RAY EXAM HIP UNI 2-3 VIEWS: CPT | Mod: 26,RT

## 2023-03-15 PROCEDURE — 81001 URINALYSIS AUTO W/SCOPE: CPT

## 2023-03-15 PROCEDURE — 93010 ELECTROCARDIOGRAM REPORT: CPT

## 2023-03-15 PROCEDURE — 74177 CT ABD & PELVIS W/CONTRAST: CPT | Mod: MA

## 2023-03-15 PROCEDURE — 83690 ASSAY OF LIPASE: CPT

## 2023-03-15 PROCEDURE — 71045 X-RAY EXAM CHEST 1 VIEW: CPT

## 2023-03-15 PROCEDURE — 87186 SC STD MICRODIL/AGAR DIL: CPT

## 2023-03-15 PROCEDURE — 71045 X-RAY EXAM CHEST 1 VIEW: CPT | Mod: 26

## 2023-03-15 PROCEDURE — 80053 COMPREHEN METABOLIC PANEL: CPT

## 2023-03-15 PROCEDURE — 36415 COLL VENOUS BLD VENIPUNCTURE: CPT

## 2023-03-15 PROCEDURE — 83735 ASSAY OF MAGNESIUM: CPT

## 2023-03-15 PROCEDURE — 93005 ELECTROCARDIOGRAM TRACING: CPT

## 2023-03-15 PROCEDURE — 73502 X-RAY EXAM HIP UNI 2-3 VIEWS: CPT | Mod: RT

## 2023-03-15 PROCEDURE — 0241U: CPT

## 2023-03-15 PROCEDURE — 87077 CULTURE AEROBIC IDENTIFY: CPT

## 2023-03-15 PROCEDURE — 74177 CT ABD & PELVIS W/CONTRAST: CPT | Mod: 26,MA

## 2023-03-15 RX ORDER — METRONIDAZOLE 500 MG
1 TABLET ORAL
Qty: 21 | Refills: 0
Start: 2023-03-15 | End: 2023-03-21

## 2023-03-15 RX ORDER — SODIUM CHLORIDE 9 MG/ML
1000 INJECTION INTRAMUSCULAR; INTRAVENOUS; SUBCUTANEOUS ONCE
Refills: 0 | Status: COMPLETED | OUTPATIENT
Start: 2023-03-15 | End: 2023-03-15

## 2023-03-15 RX ADMIN — SODIUM CHLORIDE 1000 MILLILITER(S): 9 INJECTION INTRAMUSCULAR; INTRAVENOUS; SUBCUTANEOUS at 21:24

## 2023-03-15 NOTE — ED PROVIDER NOTE - PATIENT PORTAL LINK FT
You can access the FollowMyHealth Patient Portal offered by [facility] by registering at the following website: [email or web address]. By joining [facility]’s FollowMyHealth portal, you will also be able to view your health information using other applications (apps) compatible with our system.

## 2023-03-15 NOTE — ED ADULT NURSE NOTE - NSIMPLEMENTINTERV_GEN_ALL_ED
Implemented All Fall with Harm Risk Interventions:  [place] to call system. Call bell, personal items and telephone within reach. Instruct patient to call for assistance. Room bathroom lighting operational. Non-slip footwear when patient is off stretcher. Physically safe environment: no spills, clutter or unnecessary equipment. Stretcher in lowest position, wheels locked, appropriate side rails in place. Provide visual cue, wrist band, yellow gown, etc. Monitor gait and stability. Monitor for mental status changes and reorient to person, place, and time. Review medications for side effects contributing to fall risk. Reinforce activity limits and safety measures with patient and family. Provide visual clues: red socks.

## 2023-03-15 NOTE — ED PROVIDER NOTE - NSICDXPASTMEDICALHX_GEN_ALL_CORE_FT
PAST MEDICAL HISTORY:  Deformity of toe of right foot     No pertinent past medical history       Dementia

## 2023-03-15 NOTE — ED PROVIDER NOTE - OBJECTIVE STATEMENT
81 y/o female with PMHx of dementia presents to the ED s/p unwitnessed fall. Per aide at bedside, pt tripped over the carpet coming out of her bedroom about 2 hours PTA. Pt fell onto her right hip, cried out at the time which prompted aide's attention. No LOC or headstrike. Pt endorses swelling over the right hip, able to ambulate after the fall. On baby aspirin and plavix. Did not take any meds for pain PTA.

## 2023-03-15 NOTE — ED PROVIDER NOTE - PROVIDER TOKENS
PROVIDER:[TOKEN:[[id number]],FOLLOWUP:[1-3 Days]],PROVIDER:[TOKEN:[[id number]],FOLLOWUP:[4-6 Days]]

## 2023-03-15 NOTE — ED PROVIDER NOTE - CARE PROVIDER_API CALL
Andree Crawford)  Orthopaedic Surgery  [address], [address]  [place] [address]  Phone: [phone]  Fax: [phone]  Follow Up Time: 1-3 Days    Topher Mccoy)  Surgery  [address], [address]  [place] [address]  Phone: [phone]  Fax: [phone]  Follow Up Time: 4-6 Days

## 2023-03-15 NOTE — ED PROVIDER NOTE - MUSCULOSKELETAL, MLM
Spine appears normal, range of motion is not limited. Tennis ball sized hematoma to the right hip, mobile and TTP. No overlying skin changes. RLE NVI. No obvious deformity, leg lengths are equal, no rotation of the ankle.

## 2023-03-15 NOTE — ED PROVIDER NOTE - PROGRESS NOTE DETAILS
Patient has a hematoma visualized on CAT scan.  Small.  Clinically not expanding.  Patient feels well able to ambulate.  CAT scan also shows diverticulitis.  There is no abdominal tenderness palpation.  We will treat her antibiotics but would not keep her inpatient for this given no abdominal pain.  We will treat with Bactrim and metronidazole.  Patient's pending UA.  Bactrim would cover UTI if UA is positive culture is also pending.  Will discharge in care of aide who is currently at bedside.  We will have her follow-up with PCP, Ortho for the hematoma, and general surgery for diverticulitis.  Will give strict return precautions for new or worsening pain in her right hip.  Any fevers vomiting or any other new or concerning symptoms.

## 2023-03-15 NOTE — ED ADULT TRIAGE NOTE - CHIEF COMPLAINT QUOTE
trip and fell walking out of bedroom. Denies head injury or loc. on plavix and 81mg daily. c/o right hip pain. Trauma alert called. hx dementia

## 2023-03-15 NOTE — ED PROVIDER NOTE - CLINICAL SUMMARY MEDICAL DECISION MAKING FREE TEXT BOX
83 y/o female with PMHx of dementia coming in with mechanical fall on right hip. Vitals are normal, but on exam has hematoma developing on the right hip. Will scan abdomen and pelvis to rule out active bleed and fracture. Pt does not want pain meds. Disposition pending workup. Pt's on aspirin and Plavix, but no other AC.

## 2023-03-15 NOTE — ED PROVIDER NOTE - NSFOLLOWUPINSTRUCTIONS_ED_ALL_ED_FT
Take Bactrim twice a day for 7 days.      Take metronidazole every 8 hours for 7 days.      Follow-up with your primary care doctor.      Follow-up with orthopedic surgery.      Follow-up with general surgery.    Seek immediate medical assistance for any new or worsening symptoms. If you have issues obtaining follow up, please call: [phone] or [phone]  to obtain a doctor or specialist who takes your insurance in your area.       Take Tylenol as needed for pain.

## 2023-03-16 VITALS
OXYGEN SATURATION: 96 % | TEMPERATURE: 98 F | DIASTOLIC BLOOD PRESSURE: 87 MMHG | SYSTOLIC BLOOD PRESSURE: 126 MMHG | RESPIRATION RATE: 20 BRPM | HEART RATE: 78 BPM

## 2023-03-16 LAB
BACTERIA # UR AUTO: ABNORMAL
EPI CELLS # UR: SIGNIFICANT CHANGE UP
RBC CASTS # UR COMP ASSIST: SIGNIFICANT CHANGE UP /HPF (ref 0–4)
WBC UR QL: ABNORMAL /HPF (ref 0–5)

## 2023-03-16 RX ORDER — METRONIDAZOLE 500 MG
500 TABLET ORAL ONCE
Refills: 0 | Status: COMPLETED | OUTPATIENT
Start: 2023-03-16 | End: 2023-03-16

## 2023-03-16 RX ADMIN — Medication 500 MILLIGRAM(S): at 00:20

## 2023-03-16 RX ADMIN — Medication 1 TABLET(S): at 00:20

## 2023-03-19 NOTE — ED POST DISCHARGE NOTE - RESULT SUMMARY
+ Urine culture, resistant to Bactrim.  Pt. on Bactrim/Flagyl for diverticulitis.  Found incidentally on CT after fall.  I spoke with he son and instructed to stop the Flagyl/Bactrim and take Augmentin.  Return precautions also discussed.  JUAN J Wharton r

## 2025-01-10 NOTE — DISCHARGE NOTE PROVIDER - NSDCCPCAREPLAN_GEN_ALL_CORE_FT
PRINCIPAL DISCHARGE DIAGNOSIS  Diagnosis: NSTEMI (non-ST elevated myocardial infarction)  Assessment and Plan of Treatment: NSTEMI (non-ST elevated myocardial infarction)
Patient has no objection to blood transfusions.

## 2025-03-28 ENCOUNTER — INPATIENT (INPATIENT)
Facility: HOSPITAL | Age: 85
LOS: 2 days | Discharge: HOME HEALTH | DRG: 689 | End: 2025-03-31
Attending: INTERNAL MEDICINE | Admitting: INTERNAL MEDICINE
Payer: MEDICARE

## 2025-03-28 VITALS
SYSTOLIC BLOOD PRESSURE: 101 MMHG | HEART RATE: 74 BPM | DIASTOLIC BLOOD PRESSURE: 80 MMHG | RESPIRATION RATE: 18 BRPM | OXYGEN SATURATION: 93 % | TEMPERATURE: 98 F

## 2025-03-28 DIAGNOSIS — N39.0 URINARY TRACT INFECTION, SITE NOT SPECIFIED: ICD-10-CM

## 2025-03-28 DIAGNOSIS — F03.90 UNSPECIFIED DEMENTIA, UNSPECIFIED SEVERITY, WITHOUT BEHAVIORAL DISTURBANCE, PSYCHOTIC DISTURBANCE, MOOD DISTURBANCE, AND ANXIETY: ICD-10-CM

## 2025-03-28 DIAGNOSIS — R41.82 ALTERED MENTAL STATUS, UNSPECIFIED: ICD-10-CM

## 2025-03-28 DIAGNOSIS — I25.10 ATHEROSCLEROTIC HEART DISEASE OF NATIVE CORONARY ARTERY WITHOUT ANGINA PECTORIS: ICD-10-CM

## 2025-03-28 DIAGNOSIS — I10 ESSENTIAL (PRIMARY) HYPERTENSION: ICD-10-CM

## 2025-03-28 DIAGNOSIS — E78.5 HYPERLIPIDEMIA, UNSPECIFIED: ICD-10-CM

## 2025-03-28 LAB
ALBUMIN SERPL ELPH-MCNC: 3.8 G/DL — SIGNIFICANT CHANGE UP (ref 3.3–5)
ALP SERPL-CCNC: 77 U/L — SIGNIFICANT CHANGE UP (ref 40–120)
ALT FLD-CCNC: 16 U/L — SIGNIFICANT CHANGE UP (ref 12–78)
ANION GAP SERPL CALC-SCNC: 6 MMOL/L — SIGNIFICANT CHANGE UP (ref 5–17)
APPEARANCE UR: ABNORMAL
AST SERPL-CCNC: 16 U/L — SIGNIFICANT CHANGE UP (ref 15–37)
BACTERIA # UR AUTO: ABNORMAL /HPF
BASOPHILS # BLD AUTO: 0.1 K/UL — SIGNIFICANT CHANGE UP (ref 0–0.2)
BASOPHILS NFR BLD AUTO: 0.6 % — SIGNIFICANT CHANGE UP (ref 0–2)
BILIRUB SERPL-MCNC: 1 MG/DL — SIGNIFICANT CHANGE UP (ref 0.2–1.2)
BILIRUB UR-MCNC: NEGATIVE — SIGNIFICANT CHANGE UP
BUN SERPL-MCNC: 14 MG/DL — SIGNIFICANT CHANGE UP (ref 7–23)
CALCIUM SERPL-MCNC: 9.8 MG/DL — SIGNIFICANT CHANGE UP (ref 8.5–10.1)
CAST: 1 /LPF — SIGNIFICANT CHANGE UP (ref 0–4)
CHLORIDE SERPL-SCNC: 107 MMOL/L — SIGNIFICANT CHANGE UP (ref 96–108)
CO2 SERPL-SCNC: 26 MMOL/L — SIGNIFICANT CHANGE UP (ref 22–31)
COLOR SPEC: SIGNIFICANT CHANGE UP
COMMENT - URINE: SIGNIFICANT CHANGE UP
CREAT SERPL-MCNC: 0.87 MG/DL — SIGNIFICANT CHANGE UP (ref 0.5–1.3)
DIFF PNL FLD: ABNORMAL
EGFR: 66 ML/MIN/1.73M2 — SIGNIFICANT CHANGE UP
EGFR: 66 ML/MIN/1.73M2 — SIGNIFICANT CHANGE UP
EOSINOPHIL # BLD AUTO: 0.15 K/UL — SIGNIFICANT CHANGE UP (ref 0–0.5)
EOSINOPHIL NFR BLD AUTO: 0.9 % — SIGNIFICANT CHANGE UP (ref 0–6)
EPI CELLS # UR: SIGNIFICANT CHANGE UP
FLUAV AG NPH QL: SIGNIFICANT CHANGE UP
FLUBV AG NPH QL: SIGNIFICANT CHANGE UP
GLUCOSE SERPL-MCNC: 84 MG/DL — SIGNIFICANT CHANGE UP (ref 70–99)
GLUCOSE UR QL: NEGATIVE MG/DL — SIGNIFICANT CHANGE UP
GRAN CASTS # UR COMP ASSIST: SIGNIFICANT CHANGE UP
HCT VFR BLD CALC: 51.4 % — HIGH (ref 34.5–45)
HGB BLD-MCNC: 17.2 G/DL — HIGH (ref 11.5–15.5)
HYALINE CASTS # UR AUTO: SIGNIFICANT CHANGE UP
IMM GRANULOCYTES # BLD AUTO: 0.08 K/UL — HIGH (ref 0–0.07)
IMM GRANULOCYTES NFR BLD AUTO: 0.5 % — SIGNIFICANT CHANGE UP (ref 0–0.9)
KETONES UR-MCNC: NEGATIVE MG/DL — SIGNIFICANT CHANGE UP
LACTATE SERPL-SCNC: 1.9 MMOL/L — SIGNIFICANT CHANGE UP (ref 0.7–2)
LEUKOCYTE ESTERASE UR-ACNC: ABNORMAL
LYMPHOCYTES # BLD AUTO: 0.75 K/UL — LOW (ref 1–3.3)
LYMPHOCYTES NFR BLD AUTO: 4.5 % — LOW (ref 13–44)
MAGNESIUM SERPL-MCNC: 2.1 MG/DL — SIGNIFICANT CHANGE UP (ref 1.6–2.6)
MCHC RBC-ENTMCNC: 30.1 PG — SIGNIFICANT CHANGE UP (ref 27–34)
MCHC RBC-ENTMCNC: 33.5 G/DL — SIGNIFICANT CHANGE UP (ref 32–36)
MCV RBC AUTO: 89.9 FL — SIGNIFICANT CHANGE UP (ref 80–100)
MONOCYTES # BLD AUTO: 1.08 K/UL — HIGH (ref 0–0.9)
MONOCYTES NFR BLD AUTO: 6.4 % — SIGNIFICANT CHANGE UP (ref 2–14)
NEUTROPHILS # BLD AUTO: 14.62 K/UL — HIGH (ref 1.8–7.4)
NEUTROPHILS NFR BLD AUTO: 87.1 % — HIGH (ref 43–77)
NITRITE UR-MCNC: POSITIVE
NRBC # BLD AUTO: 0 K/UL — SIGNIFICANT CHANGE UP (ref 0–0)
NRBC # FLD: 0 K/UL — SIGNIFICANT CHANGE UP (ref 0–0)
NRBC BLD AUTO-RTO: 0 /100 WBCS — SIGNIFICANT CHANGE UP (ref 0–0)
PH UR: 5.5 — SIGNIFICANT CHANGE UP (ref 5–8)
PLATELET # BLD AUTO: 734 K/UL — HIGH (ref 150–400)
PMV BLD: 9.2 FL — SIGNIFICANT CHANGE UP (ref 7–13)
POTASSIUM SERPL-MCNC: 3.9 MMOL/L — SIGNIFICANT CHANGE UP (ref 3.5–5.3)
POTASSIUM SERPL-SCNC: 3.9 MMOL/L — SIGNIFICANT CHANGE UP (ref 3.5–5.3)
PROT SERPL-MCNC: 7.5 GM/DL — SIGNIFICANT CHANGE UP (ref 6–8.3)
PROT UR-MCNC: NEGATIVE MG/DL — SIGNIFICANT CHANGE UP
RBC # BLD: 5.72 M/UL — HIGH (ref 3.8–5.2)
RBC # FLD: 16.6 % — HIGH (ref 10.3–14.5)
RBC CASTS # UR COMP ASSIST: 1 /HPF — SIGNIFICANT CHANGE UP (ref 0–4)
RSV RNA NPH QL NAA+NON-PROBE: SIGNIFICANT CHANGE UP
SARS-COV-2 RNA SPEC QL NAA+PROBE: SIGNIFICANT CHANGE UP
SODIUM SERPL-SCNC: 139 MMOL/L — SIGNIFICANT CHANGE UP (ref 135–145)
SOURCE RESPIRATORY: SIGNIFICANT CHANGE UP
SP GR SPEC: 1.02 — SIGNIFICANT CHANGE UP (ref 1–1.03)
SQUAMOUS # UR AUTO: 2 /HPF — SIGNIFICANT CHANGE UP (ref 0–5)
UROBILINOGEN FLD QL: 0.2 MG/DL — SIGNIFICANT CHANGE UP (ref 0.2–1)
WBC # BLD: 16.78 K/UL — HIGH (ref 3.8–10.5)
WBC # FLD AUTO: 16.78 K/UL — HIGH (ref 3.8–10.5)
WBC UR QL: 116 /HPF — HIGH (ref 0–5)

## 2025-03-28 PROCEDURE — 71045 X-RAY EXAM CHEST 1 VIEW: CPT | Mod: 26

## 2025-03-28 PROCEDURE — 36415 COLL VENOUS BLD VENIPUNCTURE: CPT

## 2025-03-28 PROCEDURE — 99222 1ST HOSP IP/OBS MODERATE 55: CPT

## 2025-03-28 PROCEDURE — 97116 GAIT TRAINING THERAPY: CPT | Mod: GP

## 2025-03-28 PROCEDURE — 93010 ELECTROCARDIOGRAM REPORT: CPT

## 2025-03-28 PROCEDURE — 80053 COMPREHEN METABOLIC PANEL: CPT

## 2025-03-28 PROCEDURE — 85027 COMPLETE CBC AUTOMATED: CPT

## 2025-03-28 PROCEDURE — 70450 CT HEAD/BRAIN W/O DYE: CPT | Mod: 26

## 2025-03-28 PROCEDURE — 80048 BASIC METABOLIC PNL TOTAL CA: CPT

## 2025-03-28 PROCEDURE — 80061 LIPID PANEL: CPT

## 2025-03-28 PROCEDURE — 99285 EMERGENCY DEPT VISIT HI MDM: CPT

## 2025-03-28 PROCEDURE — 97162 PT EVAL MOD COMPLEX 30 MIN: CPT | Mod: GP

## 2025-03-28 PROCEDURE — 83036 HEMOGLOBIN GLYCOSYLATED A1C: CPT

## 2025-03-28 RX ORDER — HEPARIN SODIUM 1000 [USP'U]/ML
5000 INJECTION INTRAVENOUS; SUBCUTANEOUS EVERY 12 HOURS
Refills: 0 | Status: DISCONTINUED | OUTPATIENT
Start: 2025-03-28 | End: 2025-03-31

## 2025-03-28 RX ORDER — CEFTRIAXONE 500 MG/1
1000 INJECTION, POWDER, FOR SOLUTION INTRAMUSCULAR; INTRAVENOUS ONCE
Refills: 0 | Status: DISCONTINUED | OUTPATIENT
Start: 2025-03-28 | End: 2025-03-28

## 2025-03-28 RX ORDER — ACETAMINOPHEN 500 MG/5ML
650 LIQUID (ML) ORAL EVERY 6 HOURS
Refills: 0 | Status: DISCONTINUED | OUTPATIENT
Start: 2025-03-28 | End: 2025-03-31

## 2025-03-28 RX ORDER — CEFTRIAXONE 500 MG/1
1000 INJECTION, POWDER, FOR SOLUTION INTRAMUSCULAR; INTRAVENOUS ONCE
Refills: 0 | Status: COMPLETED | OUTPATIENT
Start: 2025-03-28 | End: 2025-03-28

## 2025-03-28 RX ORDER — ROSUVASTATIN CALCIUM 5 MG/1
20 TABLET, FILM COATED ORAL AT BEDTIME
Refills: 0 | Status: DISCONTINUED | OUTPATIENT
Start: 2025-03-28 | End: 2025-03-29

## 2025-03-28 RX ORDER — MELATONIN 5 MG
3 TABLET ORAL AT BEDTIME
Refills: 0 | Status: DISCONTINUED | OUTPATIENT
Start: 2025-03-28 | End: 2025-03-31

## 2025-03-28 RX ORDER — ASPIRIN 325 MG
81 TABLET ORAL DAILY
Refills: 0 | Status: DISCONTINUED | OUTPATIENT
Start: 2025-03-28 | End: 2025-03-31

## 2025-03-28 RX ORDER — LOSARTAN POTASSIUM 100 MG/1
50 TABLET, FILM COATED ORAL DAILY
Refills: 0 | Status: DISCONTINUED | OUTPATIENT
Start: 2025-03-28 | End: 2025-03-31

## 2025-03-28 RX ORDER — MAGNESIUM, ALUMINUM HYDROXIDE 200-200 MG
30 TABLET,CHEWABLE ORAL EVERY 4 HOURS
Refills: 0 | Status: DISCONTINUED | OUTPATIENT
Start: 2025-03-28 | End: 2025-03-31

## 2025-03-28 RX ORDER — CEFTRIAXONE 500 MG/1
1000 INJECTION, POWDER, FOR SOLUTION INTRAMUSCULAR; INTRAVENOUS EVERY 24 HOURS
Refills: 0 | Status: DISCONTINUED | OUTPATIENT
Start: 2025-03-28 | End: 2025-03-28

## 2025-03-28 RX ORDER — CLOPIDOGREL BISULFATE 75 MG/1
75 TABLET, FILM COATED ORAL DAILY
Refills: 0 | Status: DISCONTINUED | OUTPATIENT
Start: 2025-03-28 | End: 2025-03-31

## 2025-03-28 RX ORDER — DONEPEZIL HYDROCHLORIDE 5 MG/1
10 TABLET ORAL AT BEDTIME
Refills: 0 | Status: DISCONTINUED | OUTPATIENT
Start: 2025-03-28 | End: 2025-03-31

## 2025-03-28 RX ORDER — ONDANSETRON HCL/PF 4 MG/2 ML
4 VIAL (ML) INJECTION EVERY 8 HOURS
Refills: 0 | Status: DISCONTINUED | OUTPATIENT
Start: 2025-03-28 | End: 2025-03-31

## 2025-03-28 RX ORDER — CEFTRIAXONE 500 MG/1
1000 INJECTION, POWDER, FOR SOLUTION INTRAMUSCULAR; INTRAVENOUS EVERY 24 HOURS
Refills: 0 | Status: DISCONTINUED | OUTPATIENT
Start: 2025-03-29 | End: 2025-03-31

## 2025-03-28 RX ADMIN — CEFTRIAXONE 1000 MILLIGRAM(S): 500 INJECTION, POWDER, FOR SOLUTION INTRAMUSCULAR; INTRAVENOUS at 16:40

## 2025-03-28 RX ADMIN — HEPARIN SODIUM 5000 UNIT(S): 1000 INJECTION INTRAVENOUS; SUBCUTANEOUS at 22:58

## 2025-03-28 RX ADMIN — Medication 2000 MILLILITER(S): at 16:41

## 2025-03-28 NOTE — ED ADULT NURSE NOTE - CAS EDN DISCHARGE INTERVENTIONS
Principal Discharge DX:	Cholecystitis with cholelithiasis  Goal:	Treatment  Instructions for follow-up, activity and diet:	You came into the hospital with pain in your abdomen which was due to a stone that was found in your gallbladder and collecting system of your liver. You have had a procedure that has removed the stones. Please follow up with Dr. Trevizo from General Surgery 6 weeks upon leaving the hospital to discuss the possibility of having an elective removal of your gallbladder to prevent recurrent inflammation in the future.  Secondary Diagnosis:	Transaminitis  Goal:	Treatment  Instructions for follow-up, activity and diet:	During your admission, blood work has shown abnormalities in your liver enzymes which were related to the stones that were causing an obstruction. We have been following your blood work daily and to see if the levels would come down after the stones were removed.  Secondary Diagnosis:	Anticoagulant disorder  Goal:	Work Up  Instructions for follow-up, activity and diet:	During this admission, initial ultrasounds of your abdomen have shown a possible partial clot in your liver which was ruled out on subsequent imaging. However, further blood work revealed a possibility for an increased risk to clot which has to be worked up further. Please follow up with the hematologists 12 weeks upon leaving the hospital for repeat blood work. IV intact

## 2025-03-28 NOTE — H&P ADULT - PROBLEM SELECTOR PLAN 2
UA cloudy, leuk esterase+ nitrites +. Started on Ceftriaxone  -Continue Ceftriaxone daily  -Follow Urine Cx  -Follow Blood cultures

## 2025-03-28 NOTE — H&P ADULT - NSICDXPASTMEDICALHX_GEN_ALL_CORE_FT
PAST MEDICAL HISTORY:  Deformity of toe of right foot     Dementia     No pertinent past medical history

## 2025-03-28 NOTE — PATIENT PROFILE ADULT - FALL HARM RISK - HARM RISK INTERVENTIONS
Assistance with ambulation/Assistance OOB with selected safe patient handling equipment/Communicate Risk of Fall with Harm to all staff/Discuss with provider need for PT consult/Monitor gait and stability/Reinforce activity limits and safety measures with patient and family/Tailored Fall Risk Interventions/Visual Cue: Yellow wristband and red socks/Bed in lowest position, wheels locked, appropriate side rails in place/Call bell, personal items and telephone in reach/Instruct patient to call for assistance before getting out of bed or chair/Non-slip footwear when patient is out of bed/[place] to call system/Physically safe environment - no spills, clutter or unnecessary equipment/Purposeful Proactive Rounding/Room/bathroom lighting operational, light cord in reach

## 2025-03-28 NOTE — H&P ADULT - NSHPPHYSICALEXAM_GEN_ALL_CORE
PHYSICAL EXAM:  GENERAL: NAD, speaks in full sentences, no signs of respiratory distress  HEAD:  Atraumatic, Normocephalic  CHEST/LUNG: Clear to auscultation bilaterally; No wheeze; No crackles; No accessory muscles used  HEART: Regular rate and rhythm; No murmurs;   ABDOMEN: Soft, Nontender, Nondistended; Bowel sounds present; No guarding  EXTREMITIES:  2+ Peripheral Pulses, No cyanosis or edema  PSYCH: AAOx0  NEUROLOGY: non-focal  SKIN: No rashes or lesions

## 2025-03-28 NOTE — ED ADULT NURSE NOTE - OBJECTIVE STATEMENT
Pt BIBA home aid with c/o AMS x few days. as per aid at bedside, she states she has become very weak, not as awake as she normally is. There was concern of a UTI at PCP but pt was unable to give urine sample.

## 2025-03-28 NOTE — H&P ADULT - NSHPLABSRESULTS_GEN_ALL_CORE
LABS:  cret                        17.2   16.78 )-----------( 734      ( 28 Mar 2025 15:48 )             51.4         139  |  107  |  14  ----------------------------<  84  3.9   |  26  |  0.87    Ca    9.8      28 Mar 2025 15:48  Mg     2.1         TPro  7.5  /  Alb  3.8  /  TBili  1.0  /  DBili  x   /  AST  16  /  ALT  16  /  AlkPhos  77      Urinalysis Basic - ( 28 Mar 2025 16:06 )    Color: Dark Yellow / Appearance: Cloudy / S.023 / pH: x  Gluc: x / Ketone: Negative mg/dL  / Bili: Negative / Urobili: 0.2 mg/dL   Blood: x / Protein: Negative mg/dL / Nitrite: Positive   Leuk Esterase: Moderate / RBC: 1 /HPF /  /HPF   Sq Epi: x / Non Sq Epi: 2 /HPF / Bacteria: Moderate /HPF    < from: CT Head No Cont (25 @ 16:21) >    1. No evidence of acute intracranial process, as on the prior.   Progressive senescent changes, as above.  2. Paranasal sinus disease; the significance of which should be   determined on clinical basis. Correlate clinically for signs of sinusitis.        < end of copied text > LABS:  cret                        17.2   16.78 )-----------( 734      ( 28 Mar 2025 15:48 )             51.4         139  |  107  |  14  ----------------------------<  84  3.9   |  26  |  0.87    Ca    9.8      28 Mar 2025 15:48  Mg     2.1         TPro  7.5  /  Alb  3.8  /  TBili  1.0  /  DBili  x   /  AST  16  /  ALT  16  /  AlkPhos  77      Urinalysis Basic - ( 28 Mar 2025 16:06 )    Color: Dark Yellow / Appearance: Cloudy / S.023 / pH: x  Gluc: x / Ketone: Negative mg/dL  / Bili: Negative / Urobili: 0.2 mg/dL   Blood: x / Protein: Negative mg/dL / Nitrite: Positive   Leuk Esterase: Moderate / RBC: 1 /HPF /  /HPF   Sq Epi: x / Non Sq Epi: 2 /HPF / Bacteria: Moderate /HPF    < from: CT Head No Cont (25 @ 16:21) >    1. No evidence of acute intracranial process, as on the prior.   Progressive senescent changes, as above.  2. Paranasal sinus disease; the significance of which should be   determined on clinical basis. Correlate clinically for signs of sinusitis.      Prior cardiac workup:    < from: TTE Echo Complete w/o Contrast w/ Doppler (20 @ 09:42) >    Fibrocalcific changes noted to the mitral valve leaflets with preserved   leaflet excursion.   Minor mitral annular calcification is present.   Mild (1+) mitral regurgitation is present.   Fibrocalcific changes noted to the Aortic valve leaflets with preserved   leaflet excursion.   Trace aortic regurgitation is present.   ***s/p balloon pump insertion.   The left ventricle is normal in size, wall thickness, wall motion and   contractility   Estimated left ventricular ejection fraction is 55-60 %.

## 2025-03-28 NOTE — H&P ADULT - PROBLEM SELECTOR PLAN 4
s/p PCI. Continues on DAPT  -Continue aspirin and plavix. Can follow up with Cardio to discuss discontinuation of 1 agent  -Continue statins  -EKG with no ST elevation  -Check lipid panel

## 2025-03-28 NOTE — ED PROVIDER NOTE - INDEPENDENTLY INTERPRETED
Patient knows that she is back and forth with Meera on the medication. But she has one more question to ask on it . So if you can please reach out to the patient. (Methyldopa)   Yes

## 2025-03-28 NOTE — ED PROVIDER NOTE - PHYSICAL EXAMINATION
GEN: Patient awake alert NAD.   HEENT: normocephalic, atraumatic, EOMI, no scleral icterus, moist MM  CARDIAC: RRR, S1, S2, no murmur.   PULM: CTA B/L no wheeze, rhonchi, rales.   ABD: soft NT, ND, no rebound no guarding, no CVA tenderness.   MSK: Moving all extremities, no edema. 5/5 strength and full ROM in all extremities.     NEURO: gait normal, no focal neurological deficits, CN 2-12 grossly intact  SKIN: warm, dry, no rash. GEN: Patient awake alert NAD.   HEENT: normocephalic, atraumatic, EOMI, no scleral icterus, moist MM  CARDIAC: RRR, S1, S2, no murmur.   PULM: CTA B/L no wheeze, rhonchi, rales.   ABD: soft NT, ND, no rebound no guarding, no CVA tenderness.   MSK: Moving all extremities, no edema.   NEURO: A&Ox0, no focal neurological deficits,  SKIN: warm, dry, no rash.

## 2025-03-28 NOTE — H&P ADULT - HISTORY OF PRESENT ILLNESS
Patient is a 83 YO F with past medical history of HTN, Dementia, CAD and NSTEMI s/p PCI on 2020, HLD. PResented to the ED for altered mental status, was brought by HHA as pt wasn't behaving like herself, was droolin excessievly, confused and dysartrhria. Seen recently by her PCP and a urine sample was taken but was inconclussive for UTI.    ED Vitals: 101/80 mmHg, HR 74 bpm, RR 18, Sat 93% on RA.  ED course: pt found to have UTI, started on antibiotics, received IV fluids.  EKG accelerated junctional, left axis deviation, no ST elev. Patient is a 85 YO F with past medical history of HTN, Dementia, CAD and NSTEMI s/p PCI on 2020, HLD. PResented to the ED for altered mental status, was brought by HHA as pt wasn't behaving like herself, was drooling excessively confused and dysarthria Seen recently by her PCP and a urine sample was taken but was inconclusive for UTI. Pt confused on examination, NAD. Denies chest pain, SOB, dizziness, nausea, vomiting, headaches, abdominal pain.    ED Vitals: 101/80 mmHg, HR 74 bpm, RR 18, Sat 93% on RA.  ED course: pt found to have UTI, started on antibiotics, received IV fluids.  EKG accelerated junctional, left axis deviation, no ST elev.

## 2025-03-28 NOTE — H&P ADULT - ASSESSMENT
In summary, this is an 85 YO F  with past medical history of HTN, Dementia, CAD and NSTEMI s/p PCI on 2020, HLD. PResented to the ED for altered mental status.    #Altered mental status, likely secondary to infection. CT Head negative. Received 2 lts IV fluids    #UTI - UA cloudy, leuk esterase+ nitrites +. Started on Ceftriaxone  -Continue Ceftriaxone daily  -Follow Urine Cx  -Follow Blood cultures    #ELevated Hgb - pb secondary to dehydration, hemocontrated.  -Check CBC in the morning.    #HTN - Seems controlled, on telmisartan/HCTZ  -Will start Losartan 50mg and uptitrae as needed  -Continue to monitor BP inpatient    #CAD s/p PCI. Continues on DAPT  -Continue aspirin and plavix. Can follow up with Cardio to discuss discontinuation of 1 agent  -Continue statins  -EKG with no ST elevation  -Check lipid panel    #HLD - Continue statins as above    #Dementia - On donepezil  -Resume donepezil 10mg    Spoke with son given prior active medications was prednisone, he referre dis not sure if pt is on daily prednisone. Will hold on resuming prednisone and wait for med rec.    #HCM  -GI ppx: Oral ppis  -DVT ppx: Heparin SQ  -Disposition: PT + SW In summary, this is an 85 YO F  with past medical history of HTN, Dementia, CAD and NSTEMI s/p PCI on 2020, HLD. PResented to the ED for altered mental status.    #Altered mental status, likely secondary to infection. CT Head negative. Received 2 lts IV fluids    #UTI - UA cloudy, leuk esterase+ nitrites +. Started on Ceftriaxone. WBC 16  -Continue Ceftriaxone daily  -Trend WBC  -Monitor for fever spikes  -Follow Urine Cx  -Follow Blood cultures    #ELevated Hgb - pb secondary to dehydration, hemoconcentrated.  -Check CBC in the morning.    #HTN - Seems controlled, on telmisartan/HCTZ  -Will start Losartan 50mg and uptitrae as needed  -Continue to monitor BP inpatient    #CAD s/p PCI. Continues on DAPT  -Continue aspirin and plavix. Can follow up with Cardio to discuss discontinuation of 1 agent  -Continue statins  -EKG with no ST elevation  -Check lipid panel    #HLD - Continue statins as above    #Dementia - On donepezil  -Resume donepezil 10mg    Spoke with son given prior active medications was prednisone, he referre dis not sure if pt is on daily prednisone. Will hold on resuming prednisone and wait for med rec.    #HCM  -GI ppx: Oral ppis  -DVT ppx: Heparin SQ  -Disposition: PT + SW

## 2025-03-28 NOTE — ED ADULT NURSE NOTE - NSFALLUNIVINTERV_ED_ALL_ED
Bed/Stretcher in lowest position, wheels locked, appropriate side rails in place/Call bell, personal items and telephone in reach/Instruct patient to call for assistance before getting out of bed/chair/stretcher/Non-slip footwear applied when patient is off stretcher/[place] to call system/Physically safe environment - no spills, clutter or unnecessary equipment/Purposeful proactive rounding/Room/bathroom lighting operational, light cord in reach

## 2025-03-28 NOTE — ED PROVIDER NOTE - CLINICAL SUMMARY MEDICAL DECISION MAKING FREE TEXT BOX
84 year old female with PMHx of dementia presents to ED with HHA at bedside complaining of AMS for several days. aide reports that the patient has been confused, drooling excessively and having some dysarthria. aide states that the patient had a urine sample taken to rule out UTI which was inconclusive. aide states the patient is more active at baseline. Pt well appearing but more confused than usual but HD stable.  DDx includes but not limited to infection, electrolyte abnormality, no suspicion for intracranial path. Plan for labs, urine, ekg, cxr, CTH. Pt will require admission.

## 2025-03-28 NOTE — ED PROVIDER NOTE - IV ALTEPLASE INCLUSION HIDDEN
Pt went hiking and started with low back and leg pain.  Painful to sit.  RX called into [facility] and pt informed.   show

## 2025-03-28 NOTE — H&P ADULT - PROBLEM SELECTOR PLAN 3
Seems controlled, on telmisartan/HCTZ  -Will start Losartan 50mg and uptitrae as needed  -Continue to monitor BP inpatient

## 2025-03-28 NOTE — ED PROVIDER NOTE - MDM ORDERS SUBMITTED SELECTION
Imaging Studies/Medications Interpolation Flap Text: A decision was made to reconstruct the defect utilizing an interpolation axial flap and a staged reconstruction.  A telfa template was made of the defect.  This telfa template was then used to outline the interpolation flap.  The flap was excised through the skin and subcutaneous tissue down to the layer of the underlying musculature.  The interpolation flap was carefully excised within this deep plane to maintain its blood supply.  The edges of the donor site were undermined.   The donor site was closed in a primary fashion.  The pedicle was then rotated into position and sutured.  Once the tube was sutured into place, adequate blood supply was confirmed with blanching and refill.  The pedicle was then wrapped with xeroform gauze and dressed appropriately with a telfa and gauze bandage to ensure continued blood supply and protect the attached pedicle.

## 2025-03-28 NOTE — ED PROVIDER NOTE - OBJECTIVE STATEMENT
84 year old female with PMHx of dementia presents to ED with HHA at bedside complaining of AMS for several days. aide reports that the patient has been confused, drooling excessively and having some dysarthria. aide states that the patient had a urine sample taken to rule out UTI which was inconclusive. aide states the patient is more active at baseline. patient was seen at her PMD today who referred her to the ED.

## 2025-03-28 NOTE — ED ADULT TRIAGE NOTE - CHIEF COMPLAINT QUOTE
Pt BIB home aid with c/o AMS x few days. Her aid states she has become very weak, not verbally responding like she normally does. There was concern of a UTI but urine was sent and came back inconclusive. Pt is in wheelchair . Aid not sure of medical history

## 2025-03-29 LAB
A1C WITH ESTIMATED AVERAGE GLUCOSE RESULT: 5 % — SIGNIFICANT CHANGE UP (ref 4–5.6)
ALBUMIN SERPL ELPH-MCNC: 2.9 G/DL — LOW (ref 3.3–5)
ALP SERPL-CCNC: 57 U/L — SIGNIFICANT CHANGE UP (ref 40–120)
ALT FLD-CCNC: 14 U/L — SIGNIFICANT CHANGE UP (ref 12–78)
ANION GAP SERPL CALC-SCNC: 7 MMOL/L — SIGNIFICANT CHANGE UP (ref 5–17)
AST SERPL-CCNC: 12 U/L — LOW (ref 15–37)
BILIRUB SERPL-MCNC: 0.9 MG/DL — SIGNIFICANT CHANGE UP (ref 0.2–1.2)
BUN SERPL-MCNC: 10 MG/DL — SIGNIFICANT CHANGE UP (ref 7–23)
CALCIUM SERPL-MCNC: 8.6 MG/DL — SIGNIFICANT CHANGE UP (ref 8.5–10.1)
CHLORIDE SERPL-SCNC: 114 MMOL/L — HIGH (ref 96–108)
CHOLEST SERPL-MCNC: 136 MG/DL — SIGNIFICANT CHANGE UP
CO2 SERPL-SCNC: 21 MMOL/L — LOW (ref 22–31)
CREAT SERPL-MCNC: 0.73 MG/DL — SIGNIFICANT CHANGE UP (ref 0.5–1.3)
EGFR: 81 ML/MIN/1.73M2 — SIGNIFICANT CHANGE UP
EGFR: 81 ML/MIN/1.73M2 — SIGNIFICANT CHANGE UP
ESTIMATED AVERAGE GLUCOSE: 97 MG/DL — SIGNIFICANT CHANGE UP (ref 68–114)
GLUCOSE SERPL-MCNC: 79 MG/DL — SIGNIFICANT CHANGE UP (ref 70–99)
HCT VFR BLD CALC: 45.2 % — HIGH (ref 34.5–45)
HDLC SERPL-MCNC: 44 MG/DL — LOW
HGB BLD-MCNC: 14.5 G/DL — SIGNIFICANT CHANGE UP (ref 11.5–15.5)
LDLC SERPL-MCNC: 74 MG/DL — SIGNIFICANT CHANGE UP
LIPID PNL WITH DIRECT LDL SERPL: 74 MG/DL — SIGNIFICANT CHANGE UP
MCHC RBC-ENTMCNC: 28.3 PG — SIGNIFICANT CHANGE UP (ref 27–34)
MCHC RBC-ENTMCNC: 32.1 G/DL — SIGNIFICANT CHANGE UP (ref 32–36)
MCV RBC AUTO: 88.3 FL — SIGNIFICANT CHANGE UP (ref 80–100)
NONHDLC SERPL-MCNC: 92 MG/DL — SIGNIFICANT CHANGE UP
NRBC # BLD AUTO: 0 K/UL — SIGNIFICANT CHANGE UP (ref 0–0)
NRBC # FLD: 0 K/UL — SIGNIFICANT CHANGE UP (ref 0–0)
NRBC BLD AUTO-RTO: 0 /100 WBCS — SIGNIFICANT CHANGE UP (ref 0–0)
PLATELET # BLD AUTO: 630 K/UL — HIGH (ref 150–400)
PMV BLD: 9.5 FL — SIGNIFICANT CHANGE UP (ref 7–13)
POTASSIUM SERPL-MCNC: 3.6 MMOL/L — SIGNIFICANT CHANGE UP (ref 3.5–5.3)
POTASSIUM SERPL-SCNC: 3.6 MMOL/L — SIGNIFICANT CHANGE UP (ref 3.5–5.3)
PROT SERPL-MCNC: 5.9 GM/DL — LOW (ref 6–8.3)
RBC # BLD: 5.12 M/UL — SIGNIFICANT CHANGE UP (ref 3.8–5.2)
RBC # FLD: 14.6 % — HIGH (ref 10.3–14.5)
SODIUM SERPL-SCNC: 142 MMOL/L — SIGNIFICANT CHANGE UP (ref 135–145)
TRIGL SERPL-MCNC: 93 MG/DL — SIGNIFICANT CHANGE UP
WBC # BLD: 9.81 K/UL — SIGNIFICANT CHANGE UP (ref 3.8–10.5)
WBC # FLD AUTO: 9.81 K/UL — SIGNIFICANT CHANGE UP (ref 3.8–10.5)

## 2025-03-29 PROCEDURE — 99233 SBSQ HOSP IP/OBS HIGH 50: CPT

## 2025-03-29 RX ORDER — TELMISARTAN 20 MG/1
1 TABLET ORAL
Refills: 0 | DISCHARGE

## 2025-03-29 RX ORDER — ATORVASTATIN CALCIUM 80 MG/1
10 TABLET, FILM COATED ORAL AT BEDTIME
Refills: 0 | Status: DISCONTINUED | OUTPATIENT
Start: 2025-03-29 | End: 2025-03-31

## 2025-03-29 RX ORDER — QUETIAPINE FUMARATE 25 MG/1
1 TABLET ORAL
Refills: 0 | DISCHARGE

## 2025-03-29 RX ORDER — MEMANTINE HYDROCHLORIDE 21 MG/1
1 CAPSULE, EXTENDED RELEASE ORAL
Refills: 0 | DISCHARGE

## 2025-03-29 RX ADMIN — Medication 81 MILLIGRAM(S): at 10:42

## 2025-03-29 RX ADMIN — HEPARIN SODIUM 5000 UNIT(S): 1000 INJECTION INTRAVENOUS; SUBCUTANEOUS at 22:27

## 2025-03-29 RX ADMIN — DONEPEZIL HYDROCHLORIDE 10 MILLIGRAM(S): 5 TABLET ORAL at 22:27

## 2025-03-29 RX ADMIN — Medication 3 MILLIGRAM(S): at 22:27

## 2025-03-29 RX ADMIN — LOSARTAN POTASSIUM 50 MILLIGRAM(S): 100 TABLET, FILM COATED ORAL at 18:30

## 2025-03-29 RX ADMIN — HEPARIN SODIUM 5000 UNIT(S): 1000 INJECTION INTRAVENOUS; SUBCUTANEOUS at 10:42

## 2025-03-29 RX ADMIN — CEFTRIAXONE 1000 MILLIGRAM(S): 500 INJECTION, POWDER, FOR SOLUTION INTRAMUSCULAR; INTRAVENOUS at 17:22

## 2025-03-29 RX ADMIN — CLOPIDOGREL BISULFATE 75 MILLIGRAM(S): 75 TABLET, FILM COATED ORAL at 10:42

## 2025-03-29 RX ADMIN — ATORVASTATIN CALCIUM 10 MILLIGRAM(S): 80 TABLET, FILM COATED ORAL at 22:27

## 2025-03-29 NOTE — DIETITIAN INITIAL EVALUATION ADULT - ADD RECOMMEND
1) C/w regular diet as prescribed  2) Encourage protein-rich foods, maximize food preferences   3) Ensure + HP shake BID to optimize nutritional needs (provides 350 kcal, 20g protein/ shake)   4) Recommend to add MVI w/minerals, Vit C 500 mg BID, add Zinc Sulfate 220 mg x 10 days to promote wound healing.   5) Obtain weekly wt to track/trend changes   6) Confirm goals of care regarding nutrition support   RD will continue to monitor PO intake, labs, hydration, and wt prn.

## 2025-03-29 NOTE — DIETITIAN INITIAL EVALUATION ADULT - PERTINENT MEDS FT
MEDICATIONS  (STANDING):  aspirin  chewable 81 milliGRAM(s) Oral daily  cefTRIAXone Injectable. 1000 milliGRAM(s) IV Push every 24 hours  clopidogrel Tablet 75 milliGRAM(s) Oral daily  donepezil 10 milliGRAM(s) Oral at bedtime  heparin   Injectable 5000 Unit(s) SubCutaneous every 12 hours  losartan 50 milliGRAM(s) Oral daily  rosuvastatin 20 milliGRAM(s) Oral at bedtime    MEDICATIONS  (PRN):  acetaminophen     Tablet .. 650 milliGRAM(s) Oral every 6 hours PRN Temp greater or equal to 38C (100.4F), Mild Pain (1 - 3)  aluminum hydroxide/magnesium hydroxide/simethicone Suspension 30 milliLiter(s) Oral every 4 hours PRN Dyspepsia  melatonin 3 milliGRAM(s) Oral at bedtime PRN Insomnia  ondansetron Injectable 4 milliGRAM(s) IV Push every 8 hours PRN Nausea and/or Vomiting

## 2025-03-29 NOTE — DIETITIAN INITIAL EVALUATION ADULT - PERTINENT LABORATORY DATA
03-29    142  |  114[H]  |  10  ----------------------------<  79  3.6   |  21[L]  |  0.73    Ca    8.6      29 Mar 2025 07:55  Mg     2.1     03-28    TPro  5.9[L]  /  Alb  2.9[L]  /  TBili  0.9  /  DBili  x   /  AST  12[L]  /  ALT  14  /  AlkPhos  57  03-29

## 2025-03-29 NOTE — PROGRESS NOTE ADULT - NUTRITIONAL ASSESSMENT
This patient has been assessed with a concern for Malnutrition and has been determined to have a diagnosis/diagnoses of Moderate protein-calorie malnutrition.    This patient is being managed with:   Diet Regular-  Entered: Mar 28 2025  7:37PM

## 2025-03-29 NOTE — DIETITIAN INITIAL EVALUATION ADULT - OTHER INFO
Patient is a 85 YO F with past medical history of HTN, Dementia, CAD and NSTEMI s/p PCI on 2020, HLD. PResented to the ED for altered mental status, was brought by A as pt wasn't behaving like herself, was drooling excessively confused and dysarthria Seen recently by her PCP and a urine sample was taken but was inconclusive for UTI. Pt confused on examination, NAD. Denies chest pain, SOB, dizziness, nausea, vomiting, headaches, abdominal pain.  Admitted for UTI    Known to nutrition services.  Prescribed a regular diet at , recommend to c/w diet as prescribed.  Based on bed scale wt obtained by RD on 3/29 Pt wt is 116.6#.  Weight history reviewed: 8/6/20 99# - gain of 17.6#/17.7%, not significant. Patient appears thin, NFPE revealed moderate to severe muscle/fat wasting.  Recommend Ensure + HP shake BID to optimize nutritional needs (provides 350 kcal, 20g protein/ shake).  Recommend to add MVI w/minerals, Vit C 500 mg BID, add Zinc Sulfate 220 mg x 10 days to promote wound healing.  No MOLST present in shadow chart on unit, please confirm goals of care regarding nutrition support.  See additional recommendations below.

## 2025-03-30 LAB
ANION GAP SERPL CALC-SCNC: 6 MMOL/L — SIGNIFICANT CHANGE UP (ref 5–17)
BUN SERPL-MCNC: 14 MG/DL — SIGNIFICANT CHANGE UP (ref 7–23)
CALCIUM SERPL-MCNC: 9 MG/DL — SIGNIFICANT CHANGE UP (ref 8.5–10.1)
CHLORIDE SERPL-SCNC: 115 MMOL/L — HIGH (ref 96–108)
CO2 SERPL-SCNC: 20 MMOL/L — LOW (ref 22–31)
CREAT SERPL-MCNC: 0.77 MG/DL — SIGNIFICANT CHANGE UP (ref 0.5–1.3)
EGFR: 76 ML/MIN/1.73M2 — SIGNIFICANT CHANGE UP
EGFR: 76 ML/MIN/1.73M2 — SIGNIFICANT CHANGE UP
GLUCOSE SERPL-MCNC: 84 MG/DL — SIGNIFICANT CHANGE UP (ref 70–99)
HCT VFR BLD CALC: 43.3 % — SIGNIFICANT CHANGE UP (ref 34.5–45)
HGB BLD-MCNC: 14.3 G/DL — SIGNIFICANT CHANGE UP (ref 11.5–15.5)
MCHC RBC-ENTMCNC: 29.5 PG — SIGNIFICANT CHANGE UP (ref 27–34)
MCHC RBC-ENTMCNC: 33 G/DL — SIGNIFICANT CHANGE UP (ref 32–36)
MCV RBC AUTO: 89.3 FL — SIGNIFICANT CHANGE UP (ref 80–100)
NRBC # BLD AUTO: 0 K/UL — SIGNIFICANT CHANGE UP (ref 0–0)
NRBC # FLD: 0 K/UL — SIGNIFICANT CHANGE UP (ref 0–0)
NRBC BLD AUTO-RTO: 0 /100 WBCS — SIGNIFICANT CHANGE UP (ref 0–0)
PLATELET # BLD AUTO: 598 K/UL — HIGH (ref 150–400)
PMV BLD: 9.3 FL — SIGNIFICANT CHANGE UP (ref 7–13)
POTASSIUM SERPL-MCNC: 3.7 MMOL/L — SIGNIFICANT CHANGE UP (ref 3.5–5.3)
POTASSIUM SERPL-SCNC: 3.7 MMOL/L — SIGNIFICANT CHANGE UP (ref 3.5–5.3)
RBC # BLD: 4.85 M/UL — SIGNIFICANT CHANGE UP (ref 3.8–5.2)
RBC # FLD: 14.8 % — HIGH (ref 10.3–14.5)
SODIUM SERPL-SCNC: 141 MMOL/L — SIGNIFICANT CHANGE UP (ref 135–145)
WBC # BLD: 7.82 K/UL — SIGNIFICANT CHANGE UP (ref 3.8–10.5)
WBC # FLD AUTO: 7.82 K/UL — SIGNIFICANT CHANGE UP (ref 3.8–10.5)

## 2025-03-30 PROCEDURE — 99233 SBSQ HOSP IP/OBS HIGH 50: CPT

## 2025-03-30 RX ADMIN — LOSARTAN POTASSIUM 50 MILLIGRAM(S): 100 TABLET, FILM COATED ORAL at 09:35

## 2025-03-30 RX ADMIN — DONEPEZIL HYDROCHLORIDE 10 MILLIGRAM(S): 5 TABLET ORAL at 21:54

## 2025-03-30 RX ADMIN — HEPARIN SODIUM 5000 UNIT(S): 1000 INJECTION INTRAVENOUS; SUBCUTANEOUS at 21:54

## 2025-03-30 RX ADMIN — HEPARIN SODIUM 5000 UNIT(S): 1000 INJECTION INTRAVENOUS; SUBCUTANEOUS at 09:35

## 2025-03-30 RX ADMIN — Medication 81 MILLIGRAM(S): at 09:35

## 2025-03-30 RX ADMIN — ATORVASTATIN CALCIUM 10 MILLIGRAM(S): 80 TABLET, FILM COATED ORAL at 21:54

## 2025-03-30 RX ADMIN — CEFTRIAXONE 1000 MILLIGRAM(S): 500 INJECTION, POWDER, FOR SOLUTION INTRAMUSCULAR; INTRAVENOUS at 16:28

## 2025-03-30 RX ADMIN — CLOPIDOGREL BISULFATE 75 MILLIGRAM(S): 75 TABLET, FILM COATED ORAL at 09:35

## 2025-03-30 NOTE — PROGRESS NOTE ADULT - SUBJECTIVE AND OBJECTIVE BOX
HOSPITALIST ATTENDING PROGRESS NOTE    Chart and meds reviewed.      Subjective:  Patient seen and examined at the bedside. Does not offer any acute complaints.    All other systems reviewed and found to be negative with the exception of what has been described above.    MEDICATIONS  (STANDING):  aspirin  chewable 81 milliGRAM(s) Oral daily  cefTRIAXone Injectable. 1000 milliGRAM(s) IV Push every 24 hours  clopidogrel Tablet 75 milliGRAM(s) Oral daily  donepezil 10 milliGRAM(s) Oral at bedtime  heparin   Injectable 5000 Unit(s) SubCutaneous every 12 hours  losartan 50 milliGRAM(s) Oral daily  rosuvastatin 20 milliGRAM(s) Oral at bedtime    MEDICATIONS  (PRN):  acetaminophen     Tablet .. 650 milliGRAM(s) Oral every 6 hours PRN Temp greater or equal to 38C (100.4F), Mild Pain (1 - 3)  aluminum hydroxide/magnesium hydroxide/simethicone Suspension 30 milliLiter(s) Oral every 4 hours PRN Dyspepsia  melatonin 3 milliGRAM(s) Oral at bedtime PRN Insomnia  ondansetron Injectable 4 milliGRAM(s) IV Push every 8 hours PRN Nausea and/or Vomiting      PHYSICAL EXAM:  Gen: No acute distress  HEENT:  Normocephalic/Atraumatic  CV:  +S1, +S2, regular, no murmurs or rubs  RESP:   lungs clear to auscultation bilaterally, no wheezing, rales, rhonchi  GI:  abdomen soft, non-tender, non-distended  EXT:  no clubbing, no cyanosis, no edema    LABS:                            14.5   9.81  )-----------( 630      ( 29 Mar 2025 07:55 )             45.2     03-29    142  |  114[H]  |  10  ----------------------------<  79  3.6   |  21[L]  |  0.73    Ca    8.6      29 Mar 2025 07:55  Mg     2.1     03-28    TPro  5.9[L]  /  Alb  2.9[L]  /  TBili  0.9  /  DBili  x   /  AST  12[L]  /  ALT  14  /  AlkPhos  57  03-29        LIVER FUNCTIONS - ( 29 Mar 2025 07:55 )  Alb: 2.9 g/dL / Pro: 5.9 gm/dL / ALK PHOS: 57 U/L / ALT: 14 U/L / AST: 12 U/L / GGT: x             Urinalysis Basic - ( 29 Mar 2025 07:55 )    Color: x / Appearance: x / SG: x / pH: x  Gluc: 79 mg/dL / Ketone: x  / Bili: x / Urobili: x   Blood: x / Protein: x / Nitrite: x   Leuk Esterase: x / RBC: x / WBC x   Sq Epi: x / Non Sq Epi: x / Bacteria: x        Lactate, Blood: 1.9 mmol/L (03-28 @ 15:48)        CULTURES:  Blood, Urine: Non Hemolyzed Trace (03-28 @ 16:06)      Additional results/Imaging, I have personally reviewed:    Telemetry, personally reviewed:
HOSPITALIST ATTENDING PROGRESS NOTE    Chart and meds reviewed.      Subjective:  Patient seen and examined at the bedside. Does not offer any acute complaints.    All other systems reviewed and found to be negative with the exception of what has been described above.    MEDICATIONS  (STANDING):  aspirin  chewable 81 milliGRAM(s) Oral daily  cefTRIAXone Injectable. 1000 milliGRAM(s) IV Push every 24 hours  clopidogrel Tablet 75 milliGRAM(s) Oral daily  donepezil 10 milliGRAM(s) Oral at bedtime  heparin   Injectable 5000 Unit(s) SubCutaneous every 12 hours  losartan 50 milliGRAM(s) Oral daily  rosuvastatin 20 milliGRAM(s) Oral at bedtime    MEDICATIONS  (PRN):  acetaminophen     Tablet .. 650 milliGRAM(s) Oral every 6 hours PRN Temp greater or equal to 38C (100.4F), Mild Pain (1 - 3)  aluminum hydroxide/magnesium hydroxide/simethicone Suspension 30 milliLiter(s) Oral every 4 hours PRN Dyspepsia  melatonin 3 milliGRAM(s) Oral at bedtime PRN Insomnia  ondansetron Injectable 4 milliGRAM(s) IV Push every 8 hours PRN Nausea and/or Vomiting      PHYSICAL EXAM:  Gen: No acute distress  HEENT:  Normocephalic/Atraumatic  CV:  +S1, +S2, regular, no murmurs or rubs  RESP:   lungs clear to auscultation bilaterally, no wheezing, rales, rhonchi  GI:  abdomen soft, non-tender, non-distended  EXT:  no clubbing, no cyanosis, no edema    LABS:                            14.5   9.81  )-----------( 630      ( 29 Mar 2025 07:55 )             45.2     03-29    142  |  114[H]  |  10  ----------------------------<  79  3.6   |  21[L]  |  0.73    Ca    8.6      29 Mar 2025 07:55  Mg     2.1     03-28    TPro  5.9[L]  /  Alb  2.9[L]  /  TBili  0.9  /  DBili  x   /  AST  12[L]  /  ALT  14  /  AlkPhos  57  03-29        LIVER FUNCTIONS - ( 29 Mar 2025 07:55 )  Alb: 2.9 g/dL / Pro: 5.9 gm/dL / ALK PHOS: 57 U/L / ALT: 14 U/L / AST: 12 U/L / GGT: x             Urinalysis Basic - ( 29 Mar 2025 07:55 )    Color: x / Appearance: x / SG: x / pH: x  Gluc: 79 mg/dL / Ketone: x  / Bili: x / Urobili: x   Blood: x / Protein: x / Nitrite: x   Leuk Esterase: x / RBC: x / WBC x   Sq Epi: x / Non Sq Epi: x / Bacteria: x        Lactate, Blood: 1.9 mmol/L (03-28 @ 15:48)        CULTURES:  Blood, Urine: Non Hemolyzed Trace (03-28 @ 16:06)      Additional results/Imaging, I have personally reviewed:    Telemetry, personally reviewed:

## 2025-03-30 NOTE — PROGRESS NOTE ADULT - ASSESSMENT
83 YO F  with past medical history of HTN, Dementia, CAD and NSTEMI s/p PCI on 2020, and HLD presented to the ED for altered mental status.      #UTI   UA cloudy, leuk esterase+ nitrites +. WBC 17k  -Follow Urine Cx  -Follow Blood cultures  -Continue Ceftriaxone daily  -Trend WBC  -Monitor for fever spikes    #Metabolic Encephalopathy likely secondary to infection.   CT Head negative.   - Treat underlying infection  - Delirium, Fall precautions  - Cont supportive care    #Elevated Hgb, resolved  #Thrombocytosis, improving  Likely secondary to dehydration, hemoconcentrated.  -Cont to monitor    #HTN   Seems controlled, on telmisartan/HCTZ at home  -Will start Losartan 50mg and uptitrae as needed  -Continue to monitor BP inpatient    #CAD s/p PCI.   #HLD  -Continue aspirin and plavix. Can follow up with Cardio to discuss discontinuation of 1 agent  -Continue statin    #Dementia - On donepezil  -Resume donepezil 10mg    -DVT ppx: Heparin SQ    Disposition: From [facility]. PT eval ordered. Pending clinical improvement. Anticipate D/C in 2-3 days.   
85 YO F  with past medical history of HTN, Dementia, CAD and NSTEMI s/p PCI on 2020, and HLD presented to the ED for altered mental status.      #UTI   UA cloudy, leuk esterase+ nitrites +. WBC 17k  -Follow up Urine Cx notable for GNR  -Follow up Blood cultures with NGTD  -Continue Ceftriaxone daily  -Monitor for fever spikes    #Metabolic Encephalopathy likely secondary to infection.   CT Head negative.   - Treat underlying infection  - Delirium, Fall precautions  - Cont supportive care    #Elevated Hgb, resolved  #Thrombocytosis, improving  Likely secondary to dehydration, hemoconcentrated.  -Cont to monitor    #HTN   Seems controlled, on telmisartan/HCTZ at home  -Will start Losartan 50mg and uptitrae as needed  -Continue to monitor BP inpatient    #CAD s/p PCI.   #HLD  -Continue aspirin and plavix. Can follow up with Cardio to discuss discontinuation of 1 agent  -Continue statin    #Dementia - On donepezil  -Resume donepezil 10mg    -DVT ppx: Heparin SQ    Disposition: TBD. PT eval ordered. Pending clinical improvement. Anticipate D/C in 2-3 days.

## 2025-03-31 ENCOUNTER — TRANSCRIPTION ENCOUNTER (OUTPATIENT)
Age: 85
End: 2025-03-31

## 2025-03-31 VITALS
OXYGEN SATURATION: 96 % | HEART RATE: 71 BPM | RESPIRATION RATE: 17 BRPM | TEMPERATURE: 97 F | SYSTOLIC BLOOD PRESSURE: 134 MMHG | DIASTOLIC BLOOD PRESSURE: 76 MMHG

## 2025-03-31 LAB
-  AMOXICILLIN/CLAVULANIC ACID: SIGNIFICANT CHANGE UP
-  AMPICILLIN/SULBACTAM: SIGNIFICANT CHANGE UP
-  AMPICILLIN: SIGNIFICANT CHANGE UP
-  AZTREONAM: SIGNIFICANT CHANGE UP
-  CEFAZOLIN: SIGNIFICANT CHANGE UP
-  CEFEPIME: SIGNIFICANT CHANGE UP
-  CEFOXITIN: SIGNIFICANT CHANGE UP
-  CEFTRIAXONE: SIGNIFICANT CHANGE UP
-  CEFUROXIME: SIGNIFICANT CHANGE UP
-  CIPROFLOXACIN: SIGNIFICANT CHANGE UP
-  ERTAPENEM: SIGNIFICANT CHANGE UP
-  GENTAMICIN: SIGNIFICANT CHANGE UP
-  IMIPENEM: SIGNIFICANT CHANGE UP
-  LEVOFLOXACIN: SIGNIFICANT CHANGE UP
-  MEROPENEM: SIGNIFICANT CHANGE UP
-  NITROFURANTOIN: SIGNIFICANT CHANGE UP
-  PIPERACILLIN/TAZOBACTAM: SIGNIFICANT CHANGE UP
-  TOBRAMYCIN: SIGNIFICANT CHANGE UP
-  TRIMETHOPRIM/SULFAMETHOXAZOLE: SIGNIFICANT CHANGE UP
ANION GAP SERPL CALC-SCNC: 5 MMOL/L — SIGNIFICANT CHANGE UP (ref 5–17)
BUN SERPL-MCNC: 13 MG/DL — SIGNIFICANT CHANGE UP (ref 7–23)
CALCIUM SERPL-MCNC: 9 MG/DL — SIGNIFICANT CHANGE UP (ref 8.5–10.1)
CHLORIDE SERPL-SCNC: 115 MMOL/L — HIGH (ref 96–108)
CO2 SERPL-SCNC: 25 MMOL/L — SIGNIFICANT CHANGE UP (ref 22–31)
CREAT SERPL-MCNC: 0.76 MG/DL — SIGNIFICANT CHANGE UP (ref 0.5–1.3)
CULTURE RESULTS: ABNORMAL
CULTURE RESULTS: ABNORMAL
EGFR: 77 ML/MIN/1.73M2 — SIGNIFICANT CHANGE UP
EGFR: 77 ML/MIN/1.73M2 — SIGNIFICANT CHANGE UP
GLUCOSE SERPL-MCNC: 93 MG/DL — SIGNIFICANT CHANGE UP (ref 70–99)
HCT VFR BLD CALC: 45 % — SIGNIFICANT CHANGE UP (ref 34.5–45)
HGB BLD-MCNC: 14.8 G/DL — SIGNIFICANT CHANGE UP (ref 11.5–15.5)
MCHC RBC-ENTMCNC: 29 PG — SIGNIFICANT CHANGE UP (ref 27–34)
MCHC RBC-ENTMCNC: 32.9 G/DL — SIGNIFICANT CHANGE UP (ref 32–36)
MCV RBC AUTO: 88.1 FL — SIGNIFICANT CHANGE UP (ref 80–100)
METHOD TYPE: SIGNIFICANT CHANGE UP
NRBC # BLD AUTO: 0 K/UL — SIGNIFICANT CHANGE UP (ref 0–0)
NRBC # FLD: 0 K/UL — SIGNIFICANT CHANGE UP (ref 0–0)
NRBC BLD AUTO-RTO: 0 /100 WBCS — SIGNIFICANT CHANGE UP (ref 0–0)
ORGANISM # SPEC MICROSCOPIC CNT: ABNORMAL
ORGANISM # SPEC MICROSCOPIC CNT: SIGNIFICANT CHANGE UP
PLATELET # BLD AUTO: 625 K/UL — HIGH (ref 150–400)
PMV BLD: 9.3 FL — SIGNIFICANT CHANGE UP (ref 7–13)
POTASSIUM SERPL-MCNC: 3.4 MMOL/L — LOW (ref 3.5–5.3)
POTASSIUM SERPL-SCNC: 3.4 MMOL/L — LOW (ref 3.5–5.3)
RBC # BLD: 5.11 M/UL — SIGNIFICANT CHANGE UP (ref 3.8–5.2)
RBC # FLD: 14.6 % — HIGH (ref 10.3–14.5)
SODIUM SERPL-SCNC: 145 MMOL/L — SIGNIFICANT CHANGE UP (ref 135–145)
SPECIMEN SOURCE: SIGNIFICANT CHANGE UP
WBC # BLD: 7.62 K/UL — SIGNIFICANT CHANGE UP (ref 3.8–10.5)
WBC # FLD AUTO: 7.62 K/UL — SIGNIFICANT CHANGE UP (ref 3.8–10.5)

## 2025-03-31 PROCEDURE — 99239 HOSP IP/OBS DSCHRG MGMT >30: CPT

## 2025-03-31 RX ORDER — CEFUROXIME SODIUM 1.5 G
1 VIAL (EA) INJECTION
Qty: 4 | Refills: 0
Start: 2025-03-31 | End: 2025-04-01

## 2025-03-31 RX ORDER — ASPIRIN 325 MG
1 TABLET ORAL
Qty: 0 | Refills: 0 | DISCHARGE
Start: 2025-03-31

## 2025-03-31 RX ORDER — CEFUROXIME SODIUM 1.5 G
250 VIAL (EA) INJECTION EVERY 12 HOURS
Refills: 0 | Status: DISCONTINUED | OUTPATIENT
Start: 2025-03-31 | End: 2025-03-31

## 2025-03-31 RX ADMIN — CLOPIDOGREL BISULFATE 75 MILLIGRAM(S): 75 TABLET, FILM COATED ORAL at 09:58

## 2025-03-31 RX ADMIN — HEPARIN SODIUM 5000 UNIT(S): 1000 INJECTION INTRAVENOUS; SUBCUTANEOUS at 09:58

## 2025-03-31 RX ADMIN — Medication 81 MILLIGRAM(S): at 09:58

## 2025-03-31 RX ADMIN — LOSARTAN POTASSIUM 50 MILLIGRAM(S): 100 TABLET, FILM COATED ORAL at 09:58

## 2025-03-31 RX ADMIN — Medication 40 MILLIEQUIVALENT(S): at 09:58

## 2025-03-31 RX ADMIN — Medication 250 MILLIGRAM(S): at 13:30

## 2025-03-31 NOTE — DISCHARGE NOTE PROVIDER - ATTENDING DISCHARGE PHYSICAL EXAMINATION:
VITALS:  T(F): 97.3 (03-31-25 @ 15:10), Max: 98.2 (03-30-25 @ 16:05)  HR: 71 (03-31-25 @ 15:10) (56 - 71)  BP: 134/76 (03-31-25 @ 15:10) (119/70 - 138/72)  RR: 17 (03-31-25 @ 15:10) (17 - 18)  SpO2: 96% (03-31-25 @ 15:10) (96% - 97%)  Wt(kg): --    I&O's Summary      CAPILLARY BLOOD GLUCOSE          PHYSICAL EXAM:  Gen: No acute distress  HEENT:  Normocephalic/Atraumatic  CV:  +S1, +S2, regular, no murmurs or rubs  RESP:   lungs clear to auscultation bilaterally, no wheezing, rales, rhonchi  GI:  abdomen soft, non-tender, non-distended  EXT:  no clubbing, no cyanosis, no edema

## 2025-03-31 NOTE — DISCHARGE NOTE PROVIDER - DETAILS OF MALNUTRITION DIAGNOSIS/DIAGNOSES
This patient has been assessed with a concern for Malnutrition and was treated during this hospitalization for the following Nutrition diagnosis/diagnoses:     -  03/29/2025: Moderate protein-calorie malnutrition

## 2025-03-31 NOTE — PHYSICAL THERAPY INITIAL EVALUATION ADULT - MODALITIES TREATMENT COMMENTS
Pt OOB in mary chair, reclined w/ alarm+, NAD, no pain reported, pt appeared comfortable, TAMIKA, RN aware,

## 2025-03-31 NOTE — DISCHARGE NOTE PROVIDER - NSDCMRMEDTOKEN_GEN_ALL_CORE_FT
aspirin 81 mg oral tablet, chewable: 1 tab(s) orally once a day  cefuroxime 250 mg oral tablet: 1 tab(s) orally every 12 hours  clopidogrel 75 mg oral tablet: 1 tab(s) orally once a day  donepezil 10 mg oral tablet: 1 tab(s) orally once a day (at bedtime)  Namenda 10 mg oral tablet: 1 tab(s) orally 2 times a day  pravastatin 40 mg oral tablet: 1 tab(s) orally once a day  Seroquel 25 mg oral tablet: 1 tab(s) orally once a day  telmisartan 40 mg oral tablet: 1 tab(s) orally once a day

## 2025-03-31 NOTE — DISCHARGE NOTE PROVIDER - CARE PROVIDER_API CALL
Paul Lomeli  Internal Medicine  [address] [place] [address]  Phone: [phone]  Fax: [phone]  Established Patient  Follow Up Time: 1 week

## 2025-03-31 NOTE — DISCHARGE NOTE PROVIDER - HOSPITAL COURSE
85 YO F  with past medical history of HTN, Dementia, CAD and NSTEMI s/p PCI on 2020, and HLD presented to the ED for altered mental status.      #UTI   UA cloudy, leuk esterase+ nitrites +. WBC 17k  -Follow up Urine Cx notable for E. Coli  -Follow up Blood cultures with NGTD  -S/p Ceftriaxone daily--transitioned to PO Ceftin 500 mg BIG x 2 more days    #Metabolic Encephalopathy likely secondary to infection, resolved  CT Head negative. Patient back to baseline  - Delirium, Fall precautions  - Cont supportive care    #Elevated Hgb, resolved  #Thrombocytosis, improving  Likely secondary to dehydration, hemoconcentrated.  -Cont to monitor    #HTN   Seems controlled, on telmisartan/HCTZ at home  -F/u as outpt    #CAD s/p PCI.   #HLD  -Continue aspirin and plavix. Can follow up with Cardio to discuss discontinuation of 1 agent  -Continue statin    #Dementia - On donepezil  -Resume donepezil 10mg

## 2025-03-31 NOTE — DISCHARGE NOTE NURSING/CASE MANAGEMENT/SOCIAL WORK - FINANCIAL ASSISTANCE
[facility] provides services at a reduced cost to those who are determined to be eligible through [facility]’s financial assistance program. Information regarding [facility]’s financial assistance program can be found by going to [email or web address] or by calling [phone].

## 2025-03-31 NOTE — PHYSICAL THERAPY INITIAL EVALUATION ADULT - PERTINENT HX OF CURRENT PROBLEM, REHAB EVAL
85 y/o F . to the ED for altered mental status, was brought by HHA as pt wasn't behaving like herself, was drooling excessively confused and dysarthria   PMH: HTN, Dementia, CAD and NSTEMI s/p PCI on 2020, HLD

## 2025-03-31 NOTE — DISCHARGE NOTE NURSING/CASE MANAGEMENT/SOCIAL WORK - NSDCPEFALRISK_GEN_ALL_CORE
For information on Fall & Injury Prevention, visit: [email or web address] OR  [email or web address] OR  https://www.cdc.gov/steadi/patient.html

## 2025-03-31 NOTE — DISCHARGE NOTE NURSING/CASE MANAGEMENT/SOCIAL WORK - PATIENT PORTAL LINK FT
You can access the FollowMyHealth Patient Portal offered by [facility] by registering at the following website: [email or web address]. By joining [email or web address] FollowMyHealth portal, you will also be able to view your health information using other applications (apps) compatible with our system.

## 2025-03-31 NOTE — PHYSICAL THERAPY INITIAL EVALUATION ADULT - GENERAL OBSERVATIONS, REHAB EVAL
Pt seen on 5E, appears confused not able to recite her name, but knew it when PT asked is this your name, Pt willing and cooperative but very stiff.

## 2025-04-04 DIAGNOSIS — Z79.52 LONG TERM (CURRENT) USE OF SYSTEMIC STEROIDS: ICD-10-CM

## 2025-04-04 DIAGNOSIS — F02.80 DEMENTIA IN OTHER DISEASES CLASSIFIED ELSEWHERE, UNSPECIFIED SEVERITY, WITHOUT BEHAVIORAL DISTURBANCE, PSYCHOTIC DISTURBANCE, MOOD DISTURBANCE, AND ANXIETY: ICD-10-CM

## 2025-04-04 DIAGNOSIS — I10 ESSENTIAL (PRIMARY) HYPERTENSION: ICD-10-CM

## 2025-04-04 DIAGNOSIS — N39.0 URINARY TRACT INFECTION, SITE NOT SPECIFIED: ICD-10-CM

## 2025-04-04 DIAGNOSIS — E78.5 HYPERLIPIDEMIA, UNSPECIFIED: ICD-10-CM

## 2025-04-04 DIAGNOSIS — E44.0 MODERATE PROTEIN-CALORIE MALNUTRITION: ICD-10-CM

## 2025-04-04 DIAGNOSIS — I25.10 ATHEROSCLEROTIC HEART DISEASE OF NATIVE CORONARY ARTERY WITHOUT ANGINA PECTORIS: ICD-10-CM

## 2025-04-04 DIAGNOSIS — G93.41 METABOLIC ENCEPHALOPATHY: ICD-10-CM

## 2025-04-04 DIAGNOSIS — G30.9 ALZHEIMER'S DISEASE, UNSPECIFIED: ICD-10-CM

## 2025-04-04 DIAGNOSIS — E86.0 DEHYDRATION: ICD-10-CM

## 2025-04-04 DIAGNOSIS — Z79.899 OTHER LONG TERM (CURRENT) DRUG THERAPY: ICD-10-CM

## 2025-04-04 DIAGNOSIS — I25.2 OLD MYOCARDIAL INFARCTION: ICD-10-CM

## 2025-04-04 DIAGNOSIS — Z79.02 LONG TERM (CURRENT) USE OF ANTITHROMBOTICS/ANTIPLATELETS: ICD-10-CM

## 2025-04-04 DIAGNOSIS — D75.839 THROMBOCYTOSIS, UNSPECIFIED: ICD-10-CM

## 2025-04-04 DIAGNOSIS — Z79.82 LONG TERM (CURRENT) USE OF ASPIRIN: ICD-10-CM
